# Patient Record
Sex: FEMALE | Race: WHITE | NOT HISPANIC OR LATINO | Employment: UNEMPLOYED | URBAN - METROPOLITAN AREA
[De-identification: names, ages, dates, MRNs, and addresses within clinical notes are randomized per-mention and may not be internally consistent; named-entity substitution may affect disease eponyms.]

---

## 2018-09-10 ENCOUNTER — HOSPITAL ENCOUNTER (INPATIENT)
Facility: MEDICAL CENTER | Age: 23
LOS: 1 days | End: 2018-09-11
Attending: OBSTETRICS & GYNECOLOGY | Admitting: OBSTETRICS & GYNECOLOGY
Payer: COMMERCIAL

## 2018-09-10 LAB
BASOPHILS # BLD AUTO: 0.5 % (ref 0–1.8)
BASOPHILS # BLD: 0.05 K/UL (ref 0–0.12)
CRYSTALS AMN MICRO: NORMAL
EOSINOPHIL # BLD AUTO: 0.04 K/UL (ref 0–0.51)
EOSINOPHIL NFR BLD: 0.4 % (ref 0–6.9)
ERYTHROCYTE [DISTWIDTH] IN BLOOD BY AUTOMATED COUNT: 41.1 FL (ref 35.9–50)
HCT VFR BLD AUTO: 33.9 % (ref 37–47)
HGB BLD-MCNC: 11 G/DL (ref 12–16)
HOLDING TUBE BB 8507: NORMAL
IMM GRANULOCYTES # BLD AUTO: 0.15 K/UL (ref 0–0.11)
IMM GRANULOCYTES NFR BLD AUTO: 1.5 % (ref 0–0.9)
LYMPHOCYTES # BLD AUTO: 1.38 K/UL (ref 1–4.8)
LYMPHOCYTES NFR BLD: 13.7 % (ref 22–41)
MCH RBC QN AUTO: 28 PG (ref 27–33)
MCHC RBC AUTO-ENTMCNC: 32.4 G/DL (ref 33.6–35)
MCV RBC AUTO: 86.3 FL (ref 81.4–97.8)
MONOCYTES # BLD AUTO: 0.56 K/UL (ref 0–0.85)
MONOCYTES NFR BLD AUTO: 5.6 % (ref 0–13.4)
NEUTROPHILS # BLD AUTO: 7.9 K/UL (ref 2–7.15)
NEUTROPHILS NFR BLD: 78.3 % (ref 44–72)
NRBC # BLD AUTO: 0 K/UL
NRBC BLD-RTO: 0 /100 WBC
PLATELET # BLD AUTO: 199 K/UL (ref 164–446)
PMV BLD AUTO: 10.8 FL (ref 9–12.9)
RBC # BLD AUTO: 3.93 M/UL (ref 4.2–5.4)
WBC # BLD AUTO: 10.1 K/UL (ref 4.8–10.8)

## 2018-09-10 PROCEDURE — 700102 HCHG RX REV CODE 250 W/ 637 OVERRIDE(OP): Performed by: OBSTETRICS & GYNECOLOGY

## 2018-09-10 PROCEDURE — 0UQGXZZ REPAIR VAGINA, EXTERNAL APPROACH: ICD-10-PCS | Performed by: OBSTETRICS & GYNECOLOGY

## 2018-09-10 PROCEDURE — 89060 EXAM SYNOVIAL FLUID CRYSTALS: CPT

## 2018-09-10 PROCEDURE — 770002 HCHG ROOM/CARE - OB PRIVATE (112)

## 2018-09-10 PROCEDURE — 3E033VJ INTRODUCTION OF OTHER HORMONE INTO PERIPHERAL VEIN, PERCUTANEOUS APPROACH: ICD-10-PCS | Performed by: OBSTETRICS & GYNECOLOGY

## 2018-09-10 PROCEDURE — 700111 HCHG RX REV CODE 636 W/ 250 OVERRIDE (IP)

## 2018-09-10 PROCEDURE — A9270 NON-COVERED ITEM OR SERVICE: HCPCS | Performed by: OBSTETRICS & GYNECOLOGY

## 2018-09-10 PROCEDURE — 700111 HCHG RX REV CODE 636 W/ 250 OVERRIDE (IP): Performed by: OBSTETRICS & GYNECOLOGY

## 2018-09-10 PROCEDURE — 85025 COMPLETE CBC W/AUTO DIFF WBC: CPT

## 2018-09-10 PROCEDURE — 36415 COLL VENOUS BLD VENIPUNCTURE: CPT

## 2018-09-10 PROCEDURE — 59409 OBSTETRICAL CARE: CPT

## 2018-09-10 PROCEDURE — 700105 HCHG RX REV CODE 258

## 2018-09-10 PROCEDURE — 304965 HCHG RECOVERY SERVICES

## 2018-09-10 RX ORDER — DOCUSATE SODIUM 100 MG/1
100 CAPSULE, LIQUID FILLED ORAL 2 TIMES DAILY PRN
Status: DISCONTINUED | OUTPATIENT
Start: 2018-09-10 | End: 2018-09-11 | Stop reason: HOSPADM

## 2018-09-10 RX ORDER — IBUPROFEN 600 MG/1
600 TABLET ORAL EVERY 6 HOURS PRN
Status: DISCONTINUED | OUTPATIENT
Start: 2018-09-10 | End: 2018-09-11 | Stop reason: HOSPADM

## 2018-09-10 RX ORDER — MISOPROSTOL 200 UG/1
800 TABLET ORAL
Status: DISCONTINUED | OUTPATIENT
Start: 2018-09-10 | End: 2018-09-10 | Stop reason: HOSPADM

## 2018-09-10 RX ORDER — SODIUM CHLORIDE, SODIUM LACTATE, POTASSIUM CHLORIDE, CALCIUM CHLORIDE 600; 310; 30; 20 MG/100ML; MG/100ML; MG/100ML; MG/100ML
INJECTION, SOLUTION INTRAVENOUS CONTINUOUS
Status: ACTIVE | OUTPATIENT
Start: 2018-09-10 | End: 2018-09-10

## 2018-09-10 RX ORDER — BISACODYL 10 MG
10 SUPPOSITORY, RECTAL RECTAL PRN
Status: DISCONTINUED | OUTPATIENT
Start: 2018-09-10 | End: 2018-09-11 | Stop reason: HOSPADM

## 2018-09-10 RX ORDER — HYDROCODONE BITARTRATE AND ACETAMINOPHEN 5; 325 MG/1; MG/1
1 TABLET ORAL EVERY 4 HOURS PRN
Status: DISCONTINUED | OUTPATIENT
Start: 2018-09-10 | End: 2018-09-11 | Stop reason: HOSPADM

## 2018-09-10 RX ORDER — LIDOCAINE HYDROCHLORIDE 10 MG/ML
INJECTION, SOLUTION EPIDURAL; INFILTRATION; INTRACAUDAL; PERINEURAL
Status: COMPLETED
Start: 2018-09-10 | End: 2018-09-10

## 2018-09-10 RX ORDER — VITAMIN A ACETATE, BETA CAROTENE, ASCORBIC ACID, CHOLECALCIFEROL, .ALPHA.-TOCOPHEROL ACETATE, DL-, THIAMINE MONONITRATE, RIBOFLAVIN, NIACINAMIDE, PYRIDOXINE HYDROCHLORIDE, FOLIC ACID, CYANOCOBALAMIN, CALCIUM CARBONATE, FERROUS FUMARATE, ZINC OXIDE, CUPRIC OXIDE 3080; 12; 120; 400; 1; 1.84; 3; 20; 22; 920; 25; 200; 27; 10; 2 [IU]/1; UG/1; MG/1; [IU]/1; MG/1; MG/1; MG/1; MG/1; MG/1; [IU]/1; MG/1; MG/1; MG/1; MG/1; MG/1
1 TABLET, FILM COATED ORAL EVERY MORNING
Status: DISCONTINUED | OUTPATIENT
Start: 2018-09-11 | End: 2018-09-11 | Stop reason: HOSPADM

## 2018-09-10 RX ORDER — HYDROCODONE BITARTRATE AND ACETAMINOPHEN 5; 325 MG/1; MG/1
2 TABLET ORAL EVERY 4 HOURS PRN
Status: DISCONTINUED | OUTPATIENT
Start: 2018-09-10 | End: 2018-09-11 | Stop reason: HOSPADM

## 2018-09-10 RX ORDER — SODIUM CHLORIDE, SODIUM LACTATE, POTASSIUM CHLORIDE, CALCIUM CHLORIDE 600; 310; 30; 20 MG/100ML; MG/100ML; MG/100ML; MG/100ML
INJECTION, SOLUTION INTRAVENOUS
Status: COMPLETED
Start: 2018-09-10 | End: 2018-09-10

## 2018-09-10 RX ORDER — MISOPROSTOL 200 UG/1
800 TABLET ORAL
Status: DISCONTINUED | OUTPATIENT
Start: 2018-09-10 | End: 2018-09-11 | Stop reason: HOSPADM

## 2018-09-10 RX ORDER — SODIUM CHLORIDE, SODIUM LACTATE, POTASSIUM CHLORIDE, CALCIUM CHLORIDE 600; 310; 30; 20 MG/100ML; MG/100ML; MG/100ML; MG/100ML
INJECTION, SOLUTION INTRAVENOUS PRN
Status: DISCONTINUED | OUTPATIENT
Start: 2018-09-10 | End: 2018-09-11 | Stop reason: HOSPADM

## 2018-09-10 RX ADMIN — HYDROCODONE BITARTRATE AND ACETAMINOPHEN 1 TABLET: 5; 325 TABLET ORAL at 22:29

## 2018-09-10 RX ADMIN — Medication 1 MILLI-UNITS/MIN: at 10:18

## 2018-09-10 RX ADMIN — SODIUM CHLORIDE, POTASSIUM CHLORIDE, SODIUM LACTATE AND CALCIUM CHLORIDE: 600; 310; 30; 20 INJECTION, SOLUTION INTRAVENOUS at 10:18

## 2018-09-10 RX ADMIN — Medication 125 ML/HR: at 17:48

## 2018-09-10 RX ADMIN — Medication 2000 ML/HR: at 16:37

## 2018-09-10 RX ADMIN — SODIUM CHLORIDE, SODIUM LACTATE, POTASSIUM CHLORIDE, CALCIUM CHLORIDE: 600; 310; 30; 20 INJECTION, SOLUTION INTRAVENOUS at 10:18

## 2018-09-10 RX ADMIN — IBUPROFEN 600 MG: 600 TABLET, FILM COATED ORAL at 17:48

## 2018-09-10 RX ADMIN — LIDOCAINE HYDROCHLORIDE: 10 INJECTION, SOLUTION EPIDURAL; INFILTRATION; INTRACAUDAL; PERINEURAL at 16:45

## 2018-09-10 ASSESSMENT — PATIENT HEALTH QUESTIONNAIRE - PHQ9
2. FEELING DOWN, DEPRESSED, IRRITABLE, OR HOPELESS: NOT AT ALL
SUM OF ALL RESPONSES TO PHQ9 QUESTIONS 1 AND 2: 0
1. LITTLE INTEREST OR PLEASURE IN DOING THINGS: NOT AT ALL
1. LITTLE INTEREST OR PLEASURE IN DOING THINGS: NOT AT ALL
SUM OF ALL RESPONSES TO PHQ9 QUESTIONS 1 AND 2: 0
2. FEELING DOWN, DEPRESSED, IRRITABLE, OR HOPELESS: NOT AT ALL

## 2018-09-10 ASSESSMENT — LIFESTYLE VARIABLES
ALCOHOL_USE: NO
EVER_SMOKED: NEVER

## 2018-09-10 ASSESSMENT — PAIN SCALES - GENERAL
PAINLEVEL_OUTOF10: 3
PAINLEVEL_OUTOF10: 6
PAINLEVEL_OUTOF10: 2

## 2018-09-10 NOTE — PROGRESS NOTES
"             EDC  = 39.5 weeks    0700 Received report from JULIANNA Billingsley. Phoned Dr Milner on pt status and received admission orders.     0720 Pt and FOB updated on POC to start IV Pitocin. Pt does not wish to be induced at this time and declined a SL PIV despite education. \"I would like to wait and see if I make change on my own\". Dr Milner updated on pt's wishes. MD will be in later to speak with pt.     0750 Dr Milner at bedside educating pt on risks and benefits of IOL. Pt verbalized understanding and wishes to hold off on IV Pitocin at this time.      0800 Okay per Dr Milner for pt to ambulate around unit and eat breakfast.     1000 Pt agreeable to PIV and starting IV Pitocin at this time.     1632 Viable term female born with APGARs 8/9.     1800 Pt ambulated SBA to restroom and voided without difficulty. Pericare performed.      Report given to JULIANNA Tidwell.   "

## 2018-09-10 NOTE — CARE PLAN
Problem: Pain  Goal: Alleviation of Pain or a reduction in pain to the patient's comfort goal  Outcome: PROGRESSING AS EXPECTED  Pt is coping with UC pain at this time and refusing pain interventions. Pt does not wish to have epidural.    Problem: Risk for Infection, Impaired Wound Healing  Goal: Remain free from signs and symptoms of infection  Outcome: PROGRESSING AS EXPECTED  SROM at 0445, clear. Afebrile. VSS. Limited cervical checks only as clinically indicated.

## 2018-09-10 NOTE — PROGRESS NOTES
"Department of Obstetrics and Gynecology  Labor and Delivery Progress Note    ID/CC: 23 y.o. is a  at 39w4d, PROM    S: feeling CTX are increasing, but not uncomfortable yet.    O: /78   Pulse 90   Temp 37 °C (98.6 °F) (Temporal)   Ht 1.651 m (5' 5\")   Wt 72.1 kg (159 lb)   LMP 2017   BMI 26.46 kg/m²    Gen: NAD, AAO  FHT: 130/mod misael/+accels, -decels  [place]: q4-5min  SVE: 390/-2    Oxytocin: 9miliunits/min (just increased    A/P: Yesenia Lindsay is a 23 y.o.  at 39w4d, PROM.  AVSS.  Cat I FHT.  *PROM: Oxytocin per protocol.  Making cervical change.  Anticipate vaginal delivery.   - Recheck cx in 2h or as clinically indicated.    *FWB: Reassuring, reactive, Cat I FHT.     - CEFM.  *Pain: desires to go without epidural  *Rh+/RubImm/VarImm/GBSneg   - Clears    Deacon Milner M.D., 9/10/2018, 1:23 PM   "

## 2018-09-10 NOTE — H&P
"Department of Obstetrics and Gynecology  Labor and Delivery History and Physical    Date of Admission: 9/10/2018      ID: 23 y.o.  with IUP at 39w4d     Primary OB: Deacon Milner M.D.    Attending OB: Deacon Milner M.D.    CC: LOF    HPI: Yesenia Lindsay is a 23 y.o.  at 39w4d by 8w4d U/S on 2018 ultrasound, who presents with leaking of fluid that began at 0445 this am.  She has had irregular CTX since that time, but is not uncomfortable and notes that they are not regular yet.  -VB.  +FM.  No other c/o  Current pregnancy has been uncomplicated to this point.     ROS: 10 systems reviewed and negative except as noted above.    Obstetric History    - 2014, , 37wk, M, 6lb7oz, no complications.  G2 - 2017, , 41wk, F, 7lb7oz, no complications.  G3 - Current, as noted in HPI      Past Medical History  Surgical History   negative negative      Gynecologic History  Social History   Regular menses prior to pregnancy  Denies Hx of abnormal pap smears.  Denies Hx of STIs Tobacco: denies   EtOH: denies  Street Drugs: denies      Medications  Allergies   PNV No Known Allergies     Family History   Breast cancer - PGM       O: /78   Pulse 90   Temp 36.8 °C (98.2 °F) (Temporal)   Ht 1.651 m (5' 5\")   Wt 72.1 kg (159 lb)   LMP 2017   BMI 26.46 kg/m²       Gen: NAD, AAO  Resp: unlabored  Abd: Gravid, NTTP,Cephalic by [place], No rebound or guarding  Ext: NTTP, none edema, 2+DPP  Pelvic: SVE 1/50/high per RN exam    FHT:  130/mod misael/+accels, -decels  [place]: CTX [id number]    Labs:   No results found for: WBC, RBC, HEMOGLOBIN, HEMATOCRIT, MCV, RDW, PLATELETCT    Prenatal labs:   Lab  Result   Rh positive   Antibody screen negative   Rubella Immune   HIV Non-reactive   RPR Non-reactive   HBsAg negative   Varicella Immune   Urine Culture Mixed skin tessa   Gonorrhea/chlamydia neg/neg   Carrier screening  declined   Aneuploidy screening declined   1h Glucose 126   GBS " neg       A/P: Yesenia Lindsay is a  at 39w4d by 8w4d U/S who presents with PROM.  AVSS.  Cat I FHT.  *Admit to L&D  *IV, CBC, T&S on hold  *Prelabor rupture of membranes: pt is not in labor, Dx discussed and rec for oxytocin discussed.  Pt wishes to walk and if not in labor after that would be accepting of oxytocin.    *FWB: Reassuring, reactive, Cat I FHT.  IEFM.  CEFM if oxytocin initiated.  *Pain: does not desire epidural  *Global: Rh+, RubImm, VarImm, GBS neg.    - Clears    Deacon Milner MD, MS,  9/10/2018, 8:18 AM

## 2018-09-10 NOTE — PROGRESS NOTES
0552  here with EDC of  = 39.5 weeks reporting SROM and LOF. Reports positive FM, occasional UC's, denied VB. Taken to S213 accompanied by FOB, instructed to change and call out when ready.   0604 POC discussed, questions answered. VS taken, monitors applied x2.   0608 SVE 1-/-3. Fern collected and sent to lab.   0650 Lab called with positive fern result.   0700 Report to CABRERA Swenson RN. POC discussed.

## 2018-09-11 VITALS
OXYGEN SATURATION: 98 % | SYSTOLIC BLOOD PRESSURE: 102 MMHG | TEMPERATURE: 98.7 F | RESPIRATION RATE: 20 BRPM | WEIGHT: 159 LBS | BODY MASS INDEX: 26.49 KG/M2 | HEIGHT: 65 IN | HEART RATE: 57 BPM | DIASTOLIC BLOOD PRESSURE: 62 MMHG

## 2018-09-11 LAB
ERYTHROCYTE [DISTWIDTH] IN BLOOD BY AUTOMATED COUNT: 41.4 FL (ref 35.9–50)
HCT VFR BLD AUTO: 34.2 % (ref 37–47)
HGB BLD-MCNC: 10.7 G/DL (ref 12–16)
MCH RBC QN AUTO: 27.2 PG (ref 27–33)
MCHC RBC AUTO-ENTMCNC: 31.3 G/DL (ref 33.6–35)
MCV RBC AUTO: 87 FL (ref 81.4–97.8)
PLATELET # BLD AUTO: 184 K/UL (ref 164–446)
PMV BLD AUTO: 11 FL (ref 9–12.9)
RBC # BLD AUTO: 3.93 M/UL (ref 4.2–5.4)
WBC # BLD AUTO: 14.2 K/UL (ref 4.8–10.8)

## 2018-09-11 PROCEDURE — 700102 HCHG RX REV CODE 250 W/ 637 OVERRIDE(OP): Performed by: OBSTETRICS & GYNECOLOGY

## 2018-09-11 PROCEDURE — A9270 NON-COVERED ITEM OR SERVICE: HCPCS | Performed by: OBSTETRICS & GYNECOLOGY

## 2018-09-11 PROCEDURE — 36415 COLL VENOUS BLD VENIPUNCTURE: CPT

## 2018-09-11 PROCEDURE — 85027 COMPLETE CBC AUTOMATED: CPT

## 2018-09-11 RX ORDER — PSEUDOEPHEDRINE HCL 30 MG
100 TABLET ORAL 2 TIMES DAILY PRN
Qty: 60 CAP | Refills: 1 | Status: ON HOLD | OUTPATIENT
Start: 2018-09-11 | End: 2020-03-17

## 2018-09-11 RX ORDER — VITAMIN A ACETATE, BETA CAROTENE, ASCORBIC ACID, CHOLECALCIFEROL, .ALPHA.-TOCOPHEROL ACETATE, DL-, THIAMINE MONONITRATE, RIBOFLAVIN, NIACINAMIDE, PYRIDOXINE HYDROCHLORIDE, FOLIC ACID, CYANOCOBALAMIN, CALCIUM CARBONATE, FERROUS FUMARATE, ZINC OXIDE, CUPRIC OXIDE 3080; 12; 120; 400; 1; 1.84; 3; 20; 22; 920; 25; 200; 27; 10; 2 [IU]/1; UG/1; MG/1; [IU]/1; MG/1; MG/1; MG/1; MG/1; MG/1; [IU]/1; MG/1; MG/1; MG/1; MG/1; MG/1
1 TABLET, FILM COATED ORAL EVERY MORNING
Qty: 30 TAB | COMMUNITY
Start: 2018-09-12

## 2018-09-11 RX ORDER — IBUPROFEN 600 MG/1
600 TABLET ORAL EVERY 6 HOURS PRN
Qty: 30 TAB | Refills: 1 | Status: ON HOLD | OUTPATIENT
Start: 2018-09-11 | End: 2020-03-17

## 2018-09-11 RX ADMIN — Medication 1 TABLET: at 06:40

## 2018-09-11 RX ADMIN — IBUPROFEN 600 MG: 600 TABLET, FILM COATED ORAL at 13:05

## 2018-09-11 RX ADMIN — HYDROCODONE BITARTRATE AND ACETAMINOPHEN 1 TABLET: 5; 325 TABLET ORAL at 13:05

## 2018-09-11 RX ADMIN — HYDROCODONE BITARTRATE AND ACETAMINOPHEN 1 TABLET: 5; 325 TABLET ORAL at 04:45

## 2018-09-11 RX ADMIN — IBUPROFEN 600 MG: 600 TABLET, FILM COATED ORAL at 06:40

## 2018-09-11 RX ADMIN — IBUPROFEN 600 MG: 600 TABLET, FILM COATED ORAL at 00:40

## 2018-09-11 ASSESSMENT — PAIN SCALES - GENERAL
PAINLEVEL_OUTOF10: 4
PAINLEVEL_OUTOF10: 5
PAINLEVEL_OUTOF10: 4
PAINLEVEL_OUTOF10: 0
PAINLEVEL_OUTOF10: 6

## 2018-09-11 ASSESSMENT — PATIENT HEALTH QUESTIONNAIRE - PHQ9
1. LITTLE INTEREST OR PLEASURE IN DOING THINGS: NOT AT ALL
SUM OF ALL RESPONSES TO PHQ9 QUESTIONS 1 AND 2: 0
2. FEELING DOWN, DEPRESSED, IRRITABLE, OR HOPELESS: NOT AT ALL

## 2018-09-11 NOTE — PROGRESS NOTES
DC instructions reviewed. All follow up information provided to pt along with prescriptions. Pt dc'd with hospital escort via wheelchair.

## 2018-09-11 NOTE — PROGRESS NOTES
1945: Patient arrived on unit at 1945 and to room 338. Report received from Diann Labor and Delivery RN. Assessment complete. Uterus firm, lochia moderate and continuous flow, fundal rub performed until bleeding stopped. Will continue to monitor. Pitocin turned up to 200ml.hour. VSS and WDL. Patient oriented to room and unit. FOB also at bedside. POC explained and patient verbalizes understanding. Call light within reach, and encouraged to call for additional needs.     2200:Patient resting holding infant skin to skin. No needs at this time.    0000:Patient and FOB asleep    0040:Patient medicated for pain and saline locked. Fundal rub performed. No other needs at this time.    0200: Patient and FOB asleep.    0445: Patient medicated for pain. Fundal rub performed. Lab in to draw patient.    0640:Patient breastfeeding. IV removed and given medication for pain. No other needs at this time.

## 2018-09-11 NOTE — PROGRESS NOTES
Assumed care of pt at 0700. Pt declines any pain at this time. Pt states minor cramping when breastfeeding. RN discussed POC with pt and al questions addressed. Pt hoping to dc today, will follow up with MD. RN educated pt to call prior to next breastfeeding for latch assessment, pt agreeable. Hourly rounding in place.

## 2018-09-11 NOTE — DELIVERY NOTE
DATE OF SERVICE:  09/10/2018    PRIMARY AND DELIVERING OBSTETRICIAN:  Deacon Milner MD    PROCEDURE:  Normal spontaneous vaginal delivery.    PREPROCEDURE DIAGNOSES:  1.  A 23-year-old G3, P2-0-0-2 with intrauterine pregnancy at 39 weeks 4 days   gestational age.  2.  Pre-labor rupture of membranes.  3.  Oxytocin induction of labor for the same.  4.  GBS negative.  5.  Unanesthetized labor and delivery.    POSTPROCEDURE DIAGNOSES:  1.  A 23-year-old G3, P2-0-0-2 with intrauterine pregnancy at 39 weeks 4 days   gestational age.  2.  Pre-labor rupture of membranes.  3.  Oxytocin induction of labor for the same.  4.  GBS negative.  5.  Unanesthetized labor and delivery.  6.  Postpartum, status post normal spontaneous vaginal delivery.  7.  1.5 cm transverse posterior vaginal abrasion.    ANESTHESIA:  Local for repair only.    FINDINGS:  1.  Viable female infant in direct OA position delivered at 1632 on   09/10/2018.  2.  Weight 3165 g (6 pounds 15.6 ounces).  3.  Apgars 8 at 1 minute and 9 at 5 minutes.  4.  Clear amniotic fluid.  5.  Recurrent variable decelerations at the end of the first stage of labor.  6.  Tight nuchal cord x1.  7.  Intact, normal-appearing placenta with 3-vessel cord and eccentric cord   insertion.  8.  No complications.    ESTIMATED BLOOD LOSS:  250 mL    NARRATIVE:  This 23-year-old G3, P2-0-0-2 with intrauterine pregnancy at 39   weeks 4 days gestational age, presented to labor and delivery with complaints   of leaking of fluid.  She had a positive fern test and pooling on examination.    She was diagnosed with ruptured membranes.  She was not in labor almost   3-1/2 hours after delivery.  At the time of my assessment, the patient was   diagnosed with pre-labor rupture of membranes.  This diagnosis was discussed   with her and her partner in the presence of a nurse.  The patient did not   initially desire to move forward with oxytocin induction of labor.  She walks   around for  approximately an hour and did not have any more frequent   contractions than she was having on presentation, which were approximately   every 10-12 minutes.  The patient after this did accept oxytocin induction of   labor.  The patient was started on oxytocin, which was titrated to a regular   contraction pattern.  The patient then progressed along a normal labor curve   to complete dilation.  The patient then pushed over 2 contractions until   delivery of the fetal head, which occurred atraumatically.  The patient was   noted to be in hands and knees position at this point.  Protecting the   perineum, the head was delivered atraumatically.  It was guided out gently   without traction.  At this point, a single nuchal cord was noted.  This was   attempted to be reduced at the perineum; however, it was noted to be tight and   the patient then pushed to deliver the remainder of the infant.  The nuchal   cord was reduced after delivery.  The anterior and posterior shoulders   delivered quickly and atraumatically.  The infant was delivered in whole   atraumatically.  As the patient was in the hands and knees position, we   delivered the infant to the bed.  It was wrapped in a warm blanket and warmed   and dried by the awaiting baby nurse.  The nose and mouth were suctioned with   the bulb suction.  As the infant was noted to be immediately vigorous and   moving all extremities equally, we awaited cessation of cord pulsation before   the cord was doubly clamped and cut.  The placenta then delivered quickly   thereafter and was noted to be intact as noted above.  Survey of the patient's   perineum, vulva and vagina revealed the above findings.  The posterior   vaginal abrasion was infiltrated with 1% lidocaine plain.  The edges were   reapproximated with a single figure-of-eight stitch of 3-0 Vicryl.  The repair   of the edges were hemostatic and well reapproximated.  The patient tolerated   the procedure well.  There were  no complications.  Sponge and needle counts   were correct at the end of the procedure.  The patient and her infant remained   in her birthing room before later transfer to maternity.  Dr. Milner was   present throughout and performed the entire procedure.    COMPLICATIONS:  None.    CONDITION:  Good.    DISPOSITION:  Birthing room, then maternity.       ____________________________________     MD DESTINY Tabares / NTS    DD:  09/10/2018 17:06:17  DT:  09/10/2018 17:49:40    D#:  [id number]  Job#:  [id number]

## 2018-09-11 NOTE — CONSULTS
Lactation visit done. Baby asleep in dads arms.  Mother c/o nipple tenderness. Bilaterally nipples are reddened and irritated.  Given lanolin cream. Discussed importance of deep latch to prevent nipple breakdown. Encouraged to call for latch check with next feeding. Mother reports she has a 3y.o. and 18 month old who  without difficulty for 1 year each.

## 2018-09-11 NOTE — DISCHARGE INSTRUCTIONS
POSTPARTUM DISCHARGE INSTRUCTIONS FOR MOM    YOB: 1995   Age: 23 y.o.               Admit Date: 9/10/2018     Discharge Date: 2018  Attending Doctor:  Deacon Milner M.D.                  Allergies:  Patient has no known allergies.    Discharged to home by car. Discharged via wheelchair, hospital escort: Yes.  Special equipment needed: Not Applicable  Belongings with: Personal  Be sure to schedule a follow-up appointment with your primary care doctor or any specialists as instructed.     Discharge Plan:   Diet Plan: Discussed  Activity Level: Discussed  Confirmed Follow up Appointment: Patient to Call and Schedule Appointment  Confirmed Symptoms Management: Discussed  Medication Reconciliation Updated: Yes  Influenza Vaccine Indication: Patient Refuses    REASONS TO CALL YOUR OBSTETRICIAN:  1.   Persistent fever or shaking chills (Temperature higher than 100.4)  2.   Heavy bleeding (soaking more than 1 pad per hour); Passing clots  3.   Foul odor from vagina  4.   Mastitis (Breast infection; breast pain, chills, fever, redness)  5.   Urinary pain, burning or frequency  6.   Episiotomy infection  7.   Abdominal incision infection  8.   Severe depression longer than 24 hours    HAND WASHING  · Prior to handling the baby.  · Before breastfeeding or bottle feeding baby.  · After using the bathroom or changing the baby's diaper.    WOUND CARE  Ask your physician for additional care instructions.  In general:    ·  Incision:      · Keep clean and dry.    · Do NOT lift anything heavier than your baby for up to 6 weeks.    · There should not be any opening or pus.      VAGINAL CARE  · Nothing inside vagina for 6 weeks: no sexual intercourse, tampons or douching.  · Bleeding may continue for 2-4 weeks.  Amount may vary.    · Call your physician for heavy bleeding which means soaking more than 1 pad per hour    BIRTH CONTROL  · It is possible to become pregnant at any time after delivery and while  "breastfeeding.  · Plan to discuss a method of birth control with your physician at your follow up visit. visit.    DIET AND ELIMINATION  · Eating more fiber (bran cereal, fruits, and vegetables) and drinking plenty of fluids will help to avoid constipation.  · Urinary frequency after childbirth is normal.    POSTPARTUM BLUES  During the first few days after birth, you may experience a sense of the \"blues\" which may include impatience, irritability or even crying.  These feeling come and go quickly.  However, as many as 1 in 10 women experience emotional symptoms known as postpartum depression.    Postpartum depression:  May start as early as the second or third day after delivery or take several weeks or months to develop.  Symptoms of \"blues\" are present, but are more intense:  Crying spells; loss of appetite; feelings of hopelessness or loss of control; fear of touching the baby; over concern or no concern at all about the baby; little or no concern about your own appearance/caring for yourself; and/or inability to sleep or excessive sleeping.  Contact your physician if you are experiencing any of these symptoms.    Crisis Hotline:  · [place] Crisis Hotline:  8-525-GMMUPBE  Or 1-590.151.3882  · [place] Crisis Hotline:  1-645.119.6458  Or 673-186-2781    PREVENTING SHAKEN BABY:  If you are angry or stressed, PUT THE BABY IN THE CRIB, step away, take some deep breaths, and wait until you are calm to care for the baby.  DO NOT SHAKE THE BABY.  You are not alone, call a supporter for help.    · Crisis Call Center 24/7 crisis line 171-046-6566 or 1-527.198.8553  · You can also text them, text \"ANSWER\" to 790524    QUIT SMOKING/TOBACCO USE:  I understand the use of any tobacco products increases my chance of suffering from future heart disease and could cause other illnesses which may shorten my life. Quitting the use of tobacco products is the single most important thing I can do to improve my health. For further " information on smoking / tobacco cessation call a Toll Free Quit Line at 1-752.670.4168 (*[employer] [place]) or 1-981.459.5011 (American Lung Association) or you can access the web based program at www.lungusa.org.    · [place] Tobacco Users Help Line:  (359) 165-3515       Toll Free: 1-907.675.1215  · Quit Tobacco Program [facility] (812)797-6086    DEPRESSION / SUICIDE RISK:  As you are discharged from this [facility], it is important to learn how to keep safe from harming yourself.    Recognize the warning signs:  · Abrupt changes in personality, positive or negative- including increase in energy   · Giving away possessions  · Change in eating patterns- significant weight changes-  positive or negative  · Change in sleeping patterns- unable to sleep or sleeping all the time   · Unwillingness or inability to communicate  · Depression  · Unusual sadness, discouragement and loneliness  · Talk of wanting to die  · Neglect of personal appearance   · Rebelliousness- reckless behavior  · Withdrawal from people/activities they love  · Confusion- inability to concentrate     If you or a loved one observes any of these behaviors or has concerns about self-harm, here's what you can do:  · Talk about it- your feelings and reasons for harming yourself  · Remove any means that you might use to hurt yourself (examples: pills, rope, extension cords, firearm)  · Get professional help from the community (Mental Health, Substance Abuse, psychological counseling)  · Do not be alone:Call your Safe Contact- someone whom you trust who will be there for you.  · Call your local CRISIS HOTLINE 842-0603 or 021-239-9598  · Call your local Children's Mobile Crisis Response Team [place] (442) 123-7419 or www.[employer]  · Call the toll free National Suicide Prevention Hotlines   · National Suicide Prevention Lifeline 519-177-QYGN (8655)  · National Hope Line Network 800-SUICIDE  (831-3687)    DISCHARGE SURVEY:  Thank you for choosing [facility].  We hope we provided you with very good care.  You may be receiving a survey in the mail.  Please fill it out.  Your opinion is valuable to us.    ADDITIONAL EDUCATIONAL MATERIALS GIVEN TO PATIENT:        My signature on this form indicates that:  1.  I have reviewed and understand the above information  2.  My questions regarding this information have been answered to my satisfaction.  3.  I have formulated a plan with my discharge nurse to obtain my prescribed medication for home.

## 2018-09-11 NOTE — DISCHARGE SUMMARY
Discharge Summary:      Yesenia Lindsay      Admit Date:   9/10/2018  Discharge Date:  2018     Admitting diagnosis:  Pregnancy   Labor and delivery indication for care or intervention  Discharge Diagnosis: Status post vaginal, spontaneous.  Pregnancy Complications: none  Tubal Ligation:  yes        History:  History reviewed. No pertinent past medical history.  OB History    Para Term  AB Living   3 2 2     2   SAB TAB Ectopic Molar Multiple Live Births             2      # Outcome Date GA Lbr Aurelio/2nd Weight Sex Delivery Anes PTL Lv   3 Current            2 Term            1 Term                    Patient has no known allergies.  There are no active problems to display for this patient.       Hospital Course:   23 y.o. , now para 3, was admitted with the above mentioned diagnosis, underwent Induction of Labor for PROM and had vaginal, spontaneous. Patient postpartum course was unremarkable, with progressive advancement in diet , ambulation and toleration of oral analgesia. Patient without complaints today and desires discharge.      Vitals:    18 0043 18 0400 18 0738 18 0800   BP: (!) 96/59 105/62 102/62 102/62   Pulse: 71 88 (!) 57 (!) 57   Resp: 18 19 20 20   Temp: 36.4 °C (97.6 °F) 36.4 °C (97.6 °F) 37.1 °C (98.7 °F) 37.1 °C (98.7 °F)   TempSrc:       SpO2: 95% 95% 98% 98%   Weight:       Height:           Current Facility-Administered Medications   Medication Dose   • ibuprofen (MOTRIN) tablet 600 mg  600 mg   • HYDROcodone-acetaminophen (NORCO) 5-325 MG per tablet 1 Tab  1 Tab   • HYDROcodone-acetaminophen (NORCO) 5-325 MG per tablet 2 Tab  2 Tab   • LR infusion     • PRN oxytocin (PITOCIN) (20 Units/1000 mL) PRN for excessive uterine bleeding - See Admin Instr  125-999 mL/hr   • miSOPROStol (CYTOTEC) tablet 800 mcg  800 mcg   • docusate sodium (COLACE) capsule 100 mg  100 mg   • bisacodyl (DULCOLAX) suppository 10 mg  10 mg   • prenatal plus vitamin  (STUARTNATAL 1+1) 27-1 MG tablet 1 Tab  1 Tab   • oxytocin (PITOCIN) infusion (for postpartum)   mL/hr       Exam:  Gen: NAD, AAO  Abdomen: Abdomen soft, non-tender. No masses,  No organomegally, FF @ U-1. No rebound/guarding.  Fundus Tender: No  Incision: none  Extremity: no edema, no redness or tenderness in the calves or thighs, 2+DPP, Homans sign is negative, no sign of DVT       Labs:  Recent Labs      09/10/18   1015  09/11/18   0447   WBC  10.1  14.2*   RBC  3.93*  3.93*   HEMOGLOBIN  11.0*  10.7*   HEMATOCRIT  33.9*  34.2*   MCV  86.3  87.0   MCH  28.0  27.2   MCHC  32.4*  31.3*   RDW  41.1  41.4   PLATELETCT  199  184   MPV  10.8  11.0        Activity:   Discharge to home  Pelvic Rest x 6 weeks    Assessment:  normal postpartum course  Discharge Assessment: No heavy bleeding or foul vaginal discharge      Follow up: 6wk with Deacon Milner M.D.      Discharge Meds:   Current Outpatient Prescriptions   Medication Sig Dispense Refill   • ibuprofen (MOTRIN) 600 MG Tab Take 1 Tab by mouth every 6 hours as needed (For cramping after delivery; do not give if patient is receiving ketorolac (Toradol)). 30 Tab 1   • docusate sodium 100 MG Cap Take 100 mg by mouth 2 times a day as needed for Constipation. 60 Cap 1   • [START ON 9/12/2018] prenatal plus vitamin (STUARTNATAL 1+1) 27-1 MG Tab tablet Take 1 Tab by mouth every morning. 30 Tab        Deacon Milner M.D.

## 2019-07-02 ENCOUNTER — HOSPITAL ENCOUNTER (OUTPATIENT)
Dept: HOSPITAL 8 - CFH | Age: 24
Discharge: HOME | End: 2019-07-02
Attending: OBSTETRICS & GYNECOLOGY
Payer: COMMERCIAL

## 2019-07-02 DIAGNOSIS — N63.10: Primary | ICD-10-CM

## 2019-07-02 PROCEDURE — 76642 ULTRASOUND BREAST LIMITED: CPT

## 2019-09-11 LAB
ABO GROUP BLD: NORMAL
BLD GP AB SCN SERPL QL: NORMAL
HBV SURFACE AG SERPL QL IA: NORMAL
HIV 1+2 AB+HIV1 P24 AG SERPL QL IA: NORMAL
RH BLD: NORMAL
RUBV IGG SERPL IA-ACNC: NORMAL
TREPONEMA PALLIDUM IGG+IGM AB [PRESENCE] IN SERUM OR PLASMA BY IMMUNOASSAY: NORMAL

## 2020-03-17 ENCOUNTER — HOSPITAL ENCOUNTER (INPATIENT)
Facility: MEDICAL CENTER | Age: 25
LOS: 1 days | End: 2020-03-18
Attending: OBSTETRICS & GYNECOLOGY | Admitting: OBSTETRICS & GYNECOLOGY
Payer: COMMERCIAL

## 2020-03-17 LAB
BASOPHILS # BLD AUTO: 0.2 % (ref 0–1.8)
BASOPHILS # BLD: 0.03 K/UL (ref 0–0.12)
EOSINOPHIL # BLD AUTO: 0.06 K/UL (ref 0–0.51)
EOSINOPHIL NFR BLD: 0.5 % (ref 0–6.9)
ERYTHROCYTE [DISTWIDTH] IN BLOOD BY AUTOMATED COUNT: 44 FL (ref 35.9–50)
HCT VFR BLD AUTO: 39.5 % (ref 37–47)
HGB BLD-MCNC: 12.7 G/DL (ref 12–16)
HOLDING TUBE BB 8507: NORMAL
IMM GRANULOCYTES # BLD AUTO: 0.08 K/UL (ref 0–0.11)
IMM GRANULOCYTES NFR BLD AUTO: 0.7 % (ref 0–0.9)
LYMPHOCYTES # BLD AUTO: 1.53 K/UL (ref 1–4.8)
LYMPHOCYTES NFR BLD: 12.6 % (ref 22–41)
MCH RBC QN AUTO: 28.7 PG (ref 27–33)
MCHC RBC AUTO-ENTMCNC: 32.2 G/DL (ref 33.6–35)
MCV RBC AUTO: 89.4 FL (ref 81.4–97.8)
MONOCYTES # BLD AUTO: 0.82 K/UL (ref 0–0.85)
MONOCYTES NFR BLD AUTO: 6.7 % (ref 0–13.4)
NEUTROPHILS # BLD AUTO: 9.65 K/UL (ref 2–7.15)
NEUTROPHILS NFR BLD: 79.3 % (ref 44–72)
NRBC # BLD AUTO: 0 K/UL
NRBC BLD-RTO: 0 /100 WBC
PLATELET # BLD AUTO: 227 K/UL (ref 164–446)
PMV BLD AUTO: 10.6 FL (ref 9–12.9)
RBC # BLD AUTO: 4.42 M/UL (ref 4.2–5.4)
WBC # BLD AUTO: 12.2 K/UL (ref 4.8–10.8)

## 2020-03-17 PROCEDURE — 770002 HCHG ROOM/CARE - OB PRIVATE (112)

## 2020-03-17 PROCEDURE — 304965 HCHG RECOVERY SERVICES

## 2020-03-17 PROCEDURE — 700111 HCHG RX REV CODE 636 W/ 250 OVERRIDE (IP): Performed by: OBSTETRICS & GYNECOLOGY

## 2020-03-17 PROCEDURE — 10907ZC DRAINAGE OF AMNIOTIC FLUID, THERAPEUTIC FROM PRODUCTS OF CONCEPTION, VIA NATURAL OR ARTIFICIAL OPENING: ICD-10-PCS | Performed by: OBSTETRICS & GYNECOLOGY

## 2020-03-17 PROCEDURE — 59409 OBSTETRICAL CARE: CPT

## 2020-03-17 PROCEDURE — 700102 HCHG RX REV CODE 250 W/ 637 OVERRIDE(OP): Performed by: OBSTETRICS & GYNECOLOGY

## 2020-03-17 PROCEDURE — 85025 COMPLETE CBC W/AUTO DIFF WBC: CPT

## 2020-03-17 PROCEDURE — A9270 NON-COVERED ITEM OR SERVICE: HCPCS | Performed by: OBSTETRICS & GYNECOLOGY

## 2020-03-17 PROCEDURE — 36415 COLL VENOUS BLD VENIPUNCTURE: CPT

## 2020-03-17 PROCEDURE — 700105 HCHG RX REV CODE 258: Performed by: OBSTETRICS & GYNECOLOGY

## 2020-03-17 RX ORDER — DOCUSATE SODIUM 100 MG/1
100 CAPSULE, LIQUID FILLED ORAL 2 TIMES DAILY PRN
Status: DISCONTINUED | OUTPATIENT
Start: 2020-03-17 | End: 2020-03-18 | Stop reason: HOSPADM

## 2020-03-17 RX ORDER — IBUPROFEN 600 MG/1
600 TABLET ORAL EVERY 6 HOURS PRN
Status: DISCONTINUED | OUTPATIENT
Start: 2020-03-17 | End: 2020-03-18 | Stop reason: HOSPADM

## 2020-03-17 RX ORDER — SODIUM CHLORIDE, SODIUM LACTATE, POTASSIUM CHLORIDE, CALCIUM CHLORIDE 600; 310; 30; 20 MG/100ML; MG/100ML; MG/100ML; MG/100ML
INJECTION, SOLUTION INTRAVENOUS PRN
Status: DISCONTINUED | OUTPATIENT
Start: 2020-03-17 | End: 2020-03-18 | Stop reason: HOSPADM

## 2020-03-17 RX ORDER — ONDANSETRON 4 MG/1
4 TABLET, ORALLY DISINTEGRATING ORAL EVERY 6 HOURS PRN
Status: DISCONTINUED | OUTPATIENT
Start: 2020-03-17 | End: 2020-03-18 | Stop reason: HOSPADM

## 2020-03-17 RX ORDER — IBUPROFEN 600 MG/1
600 TABLET ORAL EVERY 6 HOURS PRN
Status: DISCONTINUED | OUTPATIENT
Start: 2020-03-17 | End: 2020-03-17

## 2020-03-17 RX ORDER — OXYCODONE HYDROCHLORIDE AND ACETAMINOPHEN 5; 325 MG/1; MG/1
1 TABLET ORAL EVERY 4 HOURS PRN
Status: DISCONTINUED | OUTPATIENT
Start: 2020-03-17 | End: 2020-03-18 | Stop reason: HOSPADM

## 2020-03-17 RX ORDER — DEXTROSE, SODIUM CHLORIDE, SODIUM LACTATE, POTASSIUM CHLORIDE, AND CALCIUM CHLORIDE 5; .6; .31; .03; .02 G/100ML; G/100ML; G/100ML; G/100ML; G/100ML
INJECTION, SOLUTION INTRAVENOUS CONTINUOUS
Status: DISCONTINUED | OUTPATIENT
Start: 2020-03-17 | End: 2020-03-18 | Stop reason: HOSPADM

## 2020-03-17 RX ORDER — SODIUM CHLORIDE, SODIUM LACTATE, POTASSIUM CHLORIDE, CALCIUM CHLORIDE 600; 310; 30; 20 MG/100ML; MG/100ML; MG/100ML; MG/100ML
INJECTION, SOLUTION INTRAVENOUS CONTINUOUS
Status: DISPENSED | OUTPATIENT
Start: 2020-03-17 | End: 2020-03-17

## 2020-03-17 RX ORDER — VITAMIN A ACETATE, BETA CAROTENE, ASCORBIC ACID, CHOLECALCIFEROL, .ALPHA.-TOCOPHEROL ACETATE, DL-, THIAMINE MONONITRATE, RIBOFLAVIN, NIACINAMIDE, PYRIDOXINE HYDROCHLORIDE, FOLIC ACID, CYANOCOBALAMIN, CALCIUM CARBONATE, FERROUS FUMARATE, ZINC OXIDE, CUPRIC OXIDE 3080; 12; 120; 400; 1; 1.84; 3; 20; 22; 920; 25; 200; 27; 10; 2 [IU]/1; UG/1; MG/1; [IU]/1; MG/1; MG/1; MG/1; MG/1; MG/1; [IU]/1; MG/1; MG/1; MG/1; MG/1; MG/1
1 TABLET, FILM COATED ORAL EVERY MORNING
Status: DISCONTINUED | OUTPATIENT
Start: 2020-03-18 | End: 2020-03-18 | Stop reason: HOSPADM

## 2020-03-17 RX ORDER — ONDANSETRON 2 MG/ML
4 INJECTION INTRAMUSCULAR; INTRAVENOUS EVERY 6 HOURS PRN
Status: DISCONTINUED | OUTPATIENT
Start: 2020-03-17 | End: 2020-03-18 | Stop reason: HOSPADM

## 2020-03-17 RX ORDER — MISOPROSTOL 200 UG/1
800 TABLET ORAL
Status: COMPLETED | OUTPATIENT
Start: 2020-03-17 | End: 2020-03-17

## 2020-03-17 RX ORDER — ALUMINA, MAGNESIA, AND SIMETHICONE 2400; 2400; 240 MG/30ML; MG/30ML; MG/30ML
30 SUSPENSION ORAL EVERY 6 HOURS PRN
Status: DISCONTINUED | OUTPATIENT
Start: 2020-03-17 | End: 2020-03-17 | Stop reason: HOSPADM

## 2020-03-17 RX ORDER — OXYCODONE AND ACETAMINOPHEN 10; 325 MG/1; MG/1
1 TABLET ORAL EVERY 4 HOURS PRN
Status: DISCONTINUED | OUTPATIENT
Start: 2020-03-17 | End: 2020-03-18 | Stop reason: HOSPADM

## 2020-03-17 RX ORDER — MISOPROSTOL 200 UG/1
600 TABLET ORAL
Status: DISCONTINUED | OUTPATIENT
Start: 2020-03-17 | End: 2020-03-18 | Stop reason: HOSPADM

## 2020-03-17 RX ADMIN — OXYTOCIN 125 ML/HR: 10 INJECTION, SOLUTION INTRAMUSCULAR; INTRAVENOUS at 20:15

## 2020-03-17 RX ADMIN — OXYTOCIN 2000 ML/HR: 10 INJECTION, SOLUTION INTRAMUSCULAR; INTRAVENOUS at 18:42

## 2020-03-17 RX ADMIN — SODIUM CHLORIDE 5 MILLION UNITS: 900 INJECTION INTRAVENOUS at 13:55

## 2020-03-17 RX ADMIN — IBUPROFEN 600 MG: 600 TABLET ORAL at 19:37

## 2020-03-17 RX ADMIN — SODIUM CHLORIDE 2.5 MILLION UNITS: 9 INJECTION, SOLUTION INTRAVENOUS at 17:31

## 2020-03-17 RX ADMIN — SODIUM CHLORIDE, POTASSIUM CHLORIDE, SODIUM LACTATE AND CALCIUM CHLORIDE: 600; 310; 30; 20 INJECTION, SOLUTION INTRAVENOUS at 17:39

## 2020-03-17 RX ADMIN — SODIUM CHLORIDE, POTASSIUM CHLORIDE, SODIUM LACTATE AND CALCIUM CHLORIDE: 600; 310; 30; 20 INJECTION, SOLUTION INTRAVENOUS at 19:17

## 2020-03-17 SDOH — ECONOMIC STABILITY: FOOD INSECURITY: WITHIN THE PAST 12 MONTHS, THE FOOD YOU BOUGHT JUST DIDN'T LAST AND YOU DIDN'T HAVE MONEY TO GET MORE.: NEVER TRUE

## 2020-03-17 SDOH — ECONOMIC STABILITY: FOOD INSECURITY: WITHIN THE PAST 12 MONTHS, YOU WORRIED THAT YOUR FOOD WOULD RUN OUT BEFORE YOU GOT MONEY TO BUY MORE.: NEVER TRUE

## 2020-03-17 SDOH — ECONOMIC STABILITY: TRANSPORTATION INSECURITY
IN THE PAST 12 MONTHS, HAS THE LACK OF TRANSPORTATION KEPT YOU FROM MEDICAL APPOINTMENTS OR FROM GETTING MEDICATIONS?: NO

## 2020-03-17 SDOH — ECONOMIC STABILITY: TRANSPORTATION INSECURITY
IN THE PAST 12 MONTHS, HAS LACK OF TRANSPORTATION KEPT YOU FROM MEETINGS, WORK, OR FROM GETTING THINGS NEEDED FOR DAILY LIVING?: NO

## 2020-03-17 ASSESSMENT — LIFESTYLE VARIABLES
EVER_SMOKED: NEVER
ALCOHOL_USE: NO

## 2020-03-17 ASSESSMENT — PATIENT HEALTH QUESTIONNAIRE - PHQ9
2. FEELING DOWN, DEPRESSED, IRRITABLE, OR HOPELESS: NOT AT ALL
SUM OF ALL RESPONSES TO PHQ9 QUESTIONS 1 AND 2: 0
1. LITTLE INTEREST OR PLEASURE IN DOING THINGS: NOT AT ALL

## 2020-03-17 NOTE — PROGRESS NOTES
"1404: Assumed care of pt, got report from triage RN, transferred to a labor room, discussed POC and addressed all questions. Admission procedures completed. POC is to get 2nd dose of abx prior to AROM.   1600: Pt moving frequently, coping well with contractions, declining pain medications. Provided with birthing ball, and is frequently changing positions.  1838: Dr Snyder at bedside for SVE, 9 cm, and AROM, clear fluid, pt rapidly reported that \"the baby is coming\", pt was assisted to hands and knees position and  of viable male at 1839, 8/8 apgars, skin to skin.   1900: Fundus firm, lochia light. Report given to Morenita LEVINE.   "

## 2020-03-17 NOTE — H&P
DATE OF ADMISSION:  2020     IDENTIFICATION:  This is a 25-year-old  4, para 3-0-0-3, with an EDC of   2019, EGA of 39 and 2/7 weeks who presents with a chief complaint of   uterine contractions.      HISTORY OF PRESENT ILLNESS:  This is a patient of Dr. Witt who has gotten   good prenatal care, who presents today complaining of uterine contractions.    Denies spontaneous rupture of membranes, vaginal bleeding.  Admits to good   fetal movement.  Denies symptoms of PIH.  Here in labor and delivery, her   cervix was 4-5, 90 with a bulging bag of water.  She did have a GBS UTI in   September with treatment and she repeated positive in third trimester as well.    She presents today as such.  Her prenatal care has been otherwise   uncomplicated.  She denies nausea, vomiting, fever, chills, change in bowel or   bladder habits.  Admits to good fetal movement.  Denies symptoms of PIH,   headaches, visual changes or epigastric pain.  She is quite excited to move   forward with her labor.      OBSTETRIC HISTORY:  Significant for 3 previous vaginal deliveries.      GYNECOLOGIC HISTORY:  No STDs or abnormal Paps.      PAST MEDICAL HISTORY:  ALLERGIES:  None.    CURRENT MEDICATIONS:  Prenatal vitamins.    MEDICAL PROBLEMS:  None.    SURGICAL HISTORY:  None.      SOCIAL HISTORY:  Denies alcohol, tobacco or drug use.      FAMILY HISTORY:  Noncontributory.      REVIEW OF SYSTEMS:  Review of systems x12 is negative per AMA standards   available in chart.      LABORATORY DATA:  She is beta strep positive.  She had normal Glucola.  She is   Rh positive, rubella immune, declined genetics.    PHYSICAL EXAMINATION:    VITAL SIGNS:  Patient is afebrile.  Vital signs within normal limits.  Fetal   heart tracings reactive, category 1.  She is celia irregularly.    GENERAL:  She is awake, alert, in no apparent distress.    NECK:  Supple.    HEART:  Regular.    CHEST:  Clear.    BREASTS:  Symmetrical.    ABDOMEN:   Soft, gravid, size appropriate for dates.    EXTREMITIES:  Negative.    GYNECOLOGIC:  As above.      ASSESSMENT:    1.  At this time is pregnancy at 39 and 2/7 weeks.    2.  Labor.    3.  Beta streptococcus positive.      PLAN:  At this time:    1.  Admit.    2.  Fetal heart tone and contraction monitoring.    3.  Pain control if desired.    4.  Pitocin if indicated.    5.  IV penicillin.    6.  Consent for and anticipate normal spontaneous vaginal delivery.       ____________________________________     MD IQRA Vyas / BRITTANY    DD:  03/17/2020 13:38:21  DT:  03/17/2020 16:31:27    D#:  [id number]  Job#:  [id number]

## 2020-03-17 NOTE — CARE PLAN
Problem: Pain  Goal: Alleviation of Pain or a reduction in pain to the patient's comfort goal  Outcome: PROGRESSING AS EXPECTED  Note: Pt is coping well with pain of contractions and requests non-pharmalogical measures as needed.     Problem: Risk for Infection, Impaired Wound Healing  Goal: Remain free from signs and symptoms of infection  Outcome: PROGRESSING AS EXPECTED  Note: Pt showing no s/s of infection.

## 2020-03-18 VITALS
RESPIRATION RATE: 18 BRPM | SYSTOLIC BLOOD PRESSURE: 107 MMHG | BODY MASS INDEX: 26.49 KG/M2 | TEMPERATURE: 98 F | DIASTOLIC BLOOD PRESSURE: 67 MMHG | HEIGHT: 65 IN | HEART RATE: 90 BPM | OXYGEN SATURATION: 95 % | WEIGHT: 159 LBS

## 2020-03-18 LAB
ERYTHROCYTE [DISTWIDTH] IN BLOOD BY AUTOMATED COUNT: 43 FL (ref 35.9–50)
HCT VFR BLD AUTO: 33.3 % (ref 37–47)
HGB BLD-MCNC: 11 G/DL (ref 12–16)
MCH RBC QN AUTO: 29.1 PG (ref 27–33)
MCHC RBC AUTO-ENTMCNC: 33 G/DL (ref 33.6–35)
MCV RBC AUTO: 88.1 FL (ref 81.4–97.8)
PLATELET # BLD AUTO: 190 K/UL (ref 164–446)
PMV BLD AUTO: 10.4 FL (ref 9–12.9)
RBC # BLD AUTO: 3.78 M/UL (ref 4.2–5.4)
WBC # BLD AUTO: 13.4 K/UL (ref 4.8–10.8)

## 2020-03-18 PROCEDURE — A9270 NON-COVERED ITEM OR SERVICE: HCPCS | Performed by: OBSTETRICS & GYNECOLOGY

## 2020-03-18 PROCEDURE — 85027 COMPLETE CBC AUTOMATED: CPT

## 2020-03-18 PROCEDURE — 36415 COLL VENOUS BLD VENIPUNCTURE: CPT

## 2020-03-18 PROCEDURE — 700102 HCHG RX REV CODE 250 W/ 637 OVERRIDE(OP): Performed by: OBSTETRICS & GYNECOLOGY

## 2020-03-18 RX ORDER — IBUPROFEN 600 MG/1
600 TABLET ORAL EVERY 6 HOURS PRN
Qty: 30 TAB | Refills: 1 | Status: ON HOLD | OUTPATIENT
Start: 2020-03-18 | End: 2021-09-22

## 2020-03-18 RX ADMIN — IBUPROFEN 600 MG: 600 TABLET ORAL at 08:25

## 2020-03-18 RX ADMIN — VITAMIN A, VITAMIN C, VITAMIN D-3, VITAMIN E, VITAMIN B-1, VITAMIN B-2, NIACIN, VITAMIN B-6, CALCIUM, IRON, ZINC, COPPER 1 TABLET: 4000; 120; 400; 22; 1.84; 3; 20; 10; 1; 12; 200; 27; 25; 2 TABLET ORAL at 08:25

## 2020-03-18 RX ADMIN — IBUPROFEN 600 MG: 600 TABLET ORAL at 02:16

## 2020-03-18 RX ADMIN — IBUPROFEN 600 MG: 600 TABLET ORAL at 14:22

## 2020-03-18 ASSESSMENT — EDINBURGH POSTNATAL DEPRESSION SCALE (EPDS)
I HAVE BEEN ABLE TO LAUGH AND SEE THE FUNNY SIDE OF THINGS: AS MUCH AS I ALWAYS COULD
I HAVE BEEN SO UNHAPPY THAT I HAVE HAD DIFFICULTY SLEEPING: NOT AT ALL
THINGS HAVE BEEN GETTING ON TOP OF ME: NO, I HAVE BEEN COPING AS WELL AS EVER
I HAVE BEEN SO UNHAPPY THAT I HAVE BEEN CRYING: NO, NEVER
I HAVE FELT SCARED OR PANICKY FOR NO GOOD REASON: NO, NOT AT ALL
I HAVE BLAMED MYSELF UNNECESSARILY WHEN THINGS WENT WRONG: NO, NEVER
I HAVE LOOKED FORWARD WITH ENJOYMENT TO THINGS: AS MUCH AS I EVER DID
THE THOUGHT OF HARMING MYSELF HAS OCCURRED TO ME: NEVER
I HAVE BEEN ANXIOUS OR WORRIED FOR NO GOOD REASON: NO, NOT AT ALL
I HAVE FELT SAD OR MISERABLE: NO, NOT AT ALL

## 2020-03-18 NOTE — DISCHARGE INSTRUCTIONS
POSTPARTUM DISCHARGE INSTRUCTIONS FOR MOM    YOB: 1995   Age: 25 y.o.               Admit Date: 3/17/2020     Discharge Date: 3/18/2020  Attending Doctor:  Deacon Milner M.D.                  Allergies:  Patient has no known allergies.    Discharged to home by car. Discharged via wheelchair, hospital escort: Yes.  Special equipment needed: Not Applicable  Belongings with: Personal  Be sure to schedule a follow-up appointment with your primary care doctor or any specialists as instructed.     Discharge Plan: Follow up with your doctor in 6 weeks or earlier if you have any issues.  Diet Plan: Discussed  Activity Level: Discussed  Confirmed Follow up Appointment: Patient to Call and Schedule Appointment  Confirmed Symptoms Management: Discussed  Medication Reconciliation Updated: Yes  Influenza Vaccine Indication: Not indicated: Previously immunized this influenza season and > 8 years of age    REASONS TO CALL YOUR OBSTETRICIAN:  1.   Persistent fever or shaking chills (Temperature higher than 100.4)  2.   Heavy bleeding (soaking more than 1 pad per hour); Passing clots  3.   Foul odor from vagina  4.   Mastitis (Breast infection; breast pain, chills, fever, redness)  5.   Urinary pain, burning or frequency  6.   Episiotomy infection  7.   Abdominal incision infection  8.   Severe depression longer than 24 hours    HAND WASHING  · Prior to handling the baby.  · Before breastfeeding or bottle feeding baby.  · After using the bathroom or changing the baby's diaper.    WOUND CARE  Ask your physician for additional care instructions.  In general:    ·  Incision:      · Keep clean and dry.    · Do NOT lift anything heavier than your baby for up to 6 weeks.    · There should not be any opening or pus.      VAGINAL CARE  · Nothing inside vagina for 6 weeks: no sexual intercourse, tampons or douching.  · Bleeding may continue for 2-4 weeks.  Amount may vary.    · Call your physician for heavy bleeding  "which means soaking more than 1 pad per hour    BIRTH CONTROL  · It is possible to become pregnant at any time after delivery and while breastfeeding.  · Plan to discuss a method of birth control with your physician at your follow up visit. visit.    DIET AND ELIMINATION  · Eating more fiber (bran cereal, fruits, and vegetables) and drinking plenty of fluids will help to avoid constipation.  · Urinary frequency after childbirth is normal.    POSTPARTUM BLUES  During the first few days after birth, you may experience a sense of the \"blues\" which may include impatience, irritability or even crying.  These feeling come and go quickly.  However, as many as 1 in 10 women experience emotional symptoms known as postpartum depression.    Postpartum depression:  May start as early as the second or third day after delivery or take several weeks or months to develop.  Symptoms of \"blues\" are present, but are more intense:  Crying spells; loss of appetite; feelings of hopelessness or loss of control; fear of touching the baby; over concern or no concern at all about the baby; little or no concern about your own appearance/caring for yourself; and/or inability to sleep or excessive sleeping.  Contact your physician if you are experiencing any of these symptoms.    Crisis Hotline:  · [place] Crisis Hotline:  7-571-CVMINLU  Or 1-123.836.5875  · [place] Crisis Hotline:  1-695.132.3121  Or 349-769-9087    PREVENTING SHAKEN BABY:  If you are angry or stressed, PUT THE BABY IN THE CRIB, step away, take some deep breaths, and wait until you are calm to care for the baby.  DO NOT SHAKE THE BABY.  You are not alone, call a supporter for help.    · Crisis Call Center 24/7 crisis line 685-217-6148 or 1-997.173.9650  · You can also text them, text \"ANSWER\" to 246468    QUIT SMOKING/TOBACCO USE:  I understand the use of any tobacco products increases my chance of suffering from future heart disease and could cause other illnesses which may " shorten my life. Quitting the use of tobacco products is the single most important thing I can do to improve my health. For further information on smoking / tobacco cessation call a Toll Free Quit Line at 1-678.875.2662 (*National Cancer [place]) or 1-271.895.7696 (American Lung Association) or you can access the web based program at www.lungusa.org.    · [place] Tobacco Users Help Line:  (658) 862-8113       Toll Free: 1-311.777.3843  · Quit Tobacco Program [facility] (578)253-4782    DEPRESSION / SUICIDE RISK:  As you are discharged from this [facility], it is important to learn how to keep safe from harming yourself.    Recognize the warning signs:  · Abrupt changes in personality, positive or negative- including increase in energy   · Giving away possessions  · Change in eating patterns- significant weight changes-  positive or negative  · Change in sleeping patterns- unable to sleep or sleeping all the time   · Unwillingness or inability to communicate  · Depression  · Unusual sadness, discouragement and loneliness  · Talk of wanting to die  · Neglect of personal appearance   · Rebelliousness- reckless behavior  · Withdrawal from people/activities they love  · Confusion- inability to concentrate     If you or a loved one observes any of these behaviors or has concerns about self-harm, here's what you can do:  · Talk about it- your feelings and reasons for harming yourself  · Remove any means that you might use to hurt yourself (examples: pills, rope, extension cords, firearm)  · Get professional help from the community (Mental Health, Substance Abuse, psychological counseling)  · Do not be alone:Call your Safe Contact- someone whom you trust who will be there for you.  · Call your local CRISIS HOTLINE 838-7014 or 164-376-1789  · Call your local Children's Mobile Crisis Response Team [place] (370) 930-2276 or www.ooma  · Call the toll free National Suicide  Prevention Hotlines   · National Suicide Prevention Lifeline 350-365-AJTU (3085)  · National Hope Line Network 800-SUICIDE (809-4838)    DISCHARGE SURVEY:  Thank you for choosing [facility].  We hope we provided you with very good care.  You may be receiving a survey in the mail.  Please fill it out.  Your opinion is valuable to us.    ADDITIONAL EDUCATIONAL MATERIALS GIVEN TO PATIENT:        My signature on this form indicates that:  1.  I have reviewed and understand the above information  2.  My questions regarding this information have been answered to my satisfaction.  3.  I have formulated a plan with my discharge nurse to obtain my prescribed medication for home.

## 2020-03-18 NOTE — CARE PLAN
Problem: Potential for postpartum infection related to presence of episiotomy/vaginal tear and/or uterine contamination  Goal: Patient will be absent from signs and symptoms of infection  Outcome: PROGRESSING AS EXPECTED  Note: No s/s of infection at this time.  Afebrile.  No complaints of chills.  Eating well.     Problem: Potential knowledge deficit related to lack of understanding of self and  care  Goal: Patient will verbalize understanding of self and infant care  Outcome: PROGRESSING AS EXPECTED  Note: Patient verbalizes and demonstrates understanding of self and  care.  4th child.     Problem: Communication  Goal: The ability to communicate needs accurately and effectively will improve  Outcome: MET  Note: Patient and FOB able to communicate verbally and in English.  Able to make their needs known to staff.

## 2020-03-18 NOTE — L&D DELIVERY NOTE
DATE OF SERVICE:  2020    IDENTIFICATION:  This is a 25-year-old  4, para 3-0-0-3, with an EDC of   2019, EGA of 39 and 2/7th weeks who presents with a chief complaint of   uterine contractions.    HISTORY OF PRESENT ILLNESS:  This is a patient of Dr. Milner.  She has gotten   good prenatal care.  Prenatal care has been uncomplicated.  She presents today   complaining of uterine contractions.  Her cervix was 4-5, 90 with a bulging   bag of water.  She was subsequently admitted, known beta strep positive.  She   was started on IV penicillin, received 2 doses and progressed over normal   labor curve to 8 cm at which point she was AROM clear and then she progressed   rapidly to complete, in hands and knees presentation, she was able to push   baby down to a +3 station, extended the baby's head and delivered   atraumatically.  Nares and mouth were bulb suctioned.  There was no nuchal   cord.  Shoulders and body followed rapidly without complication.  After delay,   the cord was clamped and cut.  Cord gases were not collected.  The infant was   handed off to awaiting nursing team.  At this point, the placenta delivered   intact, 3-vessel cord.  The uterus firmed up nicely after 20 units of Pitocin.    Cervix was intact.  There were no lacerations.  Estimated blood loss was 300   mL.  The patient and baby to recovery room in stable condition.    FINDINGS:  Include a male infant with Apgars of 8 and 8.  There were no   complications.       ____________________________________     MD IQRA Vyas / BRITTANY    DD:  2020 18:49:54  DT:  2020 21:26:57    D#:  [id number]  Job#:  [id number]

## 2020-03-18 NOTE — CONSULTS
Mom denies any questions or concerns. She feels baby is latching and suckling well and denies any discomfort. She nursed her now 5 year, 3, and 1 and 1/2 years olds for one year each.    Mom given Lanolin per her request. Advised to air dry her nipples after feedings, top with some expressed colostrum, and add lanolin if needed.    Mom encouraged to call for assistance prn.

## 2020-03-18 NOTE — PROGRESS NOTES
1900: Received report from MARQUES Figueredo and CABRERA Lambert RN. POC discussed.    2015: Pt taken to bathroom to void, skyler care done, educated about S/S of increased bleeding to report to RN/ MD, verbalizes understanding.    2035: Pt transferred via wheelchair to PPU. Baby in arms.    2045: Report given to JULIANNA Garces. POC discussed.

## 2020-03-18 NOTE — PROGRESS NOTES
Patient transferred from labor and delivery via wheelchair with infant in arms and FOB accompanying. Two RN verification of infant and parent armbands. Report received from JULIANNA Xvaier. Patient oriented to unit, call light, emergency light, and infant security. Assessment completed, fundus firm and palpable, lochia light rubra. Patient has already voided and is doing so without problems. Pain management discussed with pt. Patient declines intervention for pain at this time and asked that ibuprofen be given around the clock. Whiteboard updated. Second bag of pitocin infusing at 125 ml/hr. IV patent, no s/s of infiltration at insertion site. This is MOB fourth child and has  prior children. No assistance needed at this time. Pt. Oriented to call light, infant security, and I&O charting. Patient encouraged to call with needs.

## 2020-03-18 NOTE — PROGRESS NOTES
Discharge instruction for mom and baby discussed. Prescription given and explained. Emphasized the importance of  screening follow-up test. Questions and concerns have been answered. Baby is not 24 hours until 1839.

## 2020-03-18 NOTE — CARE PLAN
Problem: Altered physiologic condition related to immediate post-delivery state and potential for bleeding/hemorrhage  Goal: Patient physiologically stable as evidenced by normal lochia, palpable uterine involution and vital signs within normal limits  Outcome: PROGRESSING AS EXPECTED  Note: VSS and within normal parameters. Fundus palpable and firm, lochia light rubra. Will continue to monitor.       Problem: Alteration in comfort related to episiotomy, vaginal repair and/or after birth pains  Goal: Patient verbalizes acceptable pain level  Outcome: PROGRESSING AS EXPECTED  Note: Pain management discussed with pt. Will notify this RN if PRN pain medication is needed and would like ibuprofen when available at 0130. Will continue to monitor.

## 2020-03-18 NOTE — PROGRESS NOTES
Hospital Day : 1    S: doing well; donna diet; min lochia; breast feed    O:  Vitals:    20 19420 2100 20 0200   BP: 124/78 127/85 105/67 128/83   Pulse: (!) 55 68 72 72   Resp:   16 18   Temp:  36.8 °C (98.3 °F) 36.7 °C (98.1 °F) 37 °C (98.6 °F)   TempSrc:  Temporal Temporal Temporal   SpO2:   96% 94%   Weight:       Height:           Recent Labs     20  1355 20  0444   WBC 12.2* 13.4*   RBC 4.42 3.78*   HEMOGLOBIN 12.7 11.0*   HEMATOCRIT 39.5 33.3*   MCV 89.4 88.1   MCH 28.7 29.1   MCHC 32.2* 33.0*   RDW 44.0 43.0   PLATELETCT 227 190   MPV 10.6 10.4             abd soft ff    A: pp1 sp ; sp 2x abx for gbs; doing well    P: dc

## 2020-03-19 NOTE — PROGRESS NOTES
Received bedside report from night shift RN. Assumed care of patient. Pt assessed and stable. VSS. Patient reports 1/10 pain at this time.  Discussed plan of care for day with patient and received verbal understanding. Call light within reach, bed in low position.  Educated pt re fall precautions and received verbal understanding.  However, pt continues to refuse bed alarms.  Pt is alert, oriented, steady on feet and calls appropriately.

## 2020-03-19 NOTE — PROGRESS NOTES
- Report received from JULIANNA Campuzano. Pt. And infant currently in NBN for 24 hours testing.      - Assessment complete. VSS and within normal parameters. Discharge paperwork and education given. No questions regarding discharge or infant paperwork, circumcision care, or  screening.     Pt discharged at approximately 1940 by RN. Infant placed in car seat by parents, and checked by RN. MOB offered wheelchair upon discharge and requested to walk. FOB carrying infant and bleongings. Pt discharge instructions provided at approximately 1533 by discharge RN. Checked armbands. Clamp and cuddles already removed. No further questions at this time.

## 2021-09-18 ENCOUNTER — HOSPITAL ENCOUNTER (EMERGENCY)
Facility: MEDICAL CENTER | Age: 26
End: 2021-09-19
Attending: OBSTETRICS & GYNECOLOGY | Admitting: OBSTETRICS & GYNECOLOGY
Payer: COMMERCIAL

## 2021-09-18 PROCEDURE — 302449 STATCHG TRIAGE ONLY (STATISTIC)

## 2021-09-19 VITALS
BODY MASS INDEX: 26.52 KG/M2 | RESPIRATION RATE: 16 BRPM | TEMPERATURE: 97.9 F | OXYGEN SATURATION: 96 % | DIASTOLIC BLOOD PRESSURE: 79 MMHG | SYSTOLIC BLOOD PRESSURE: 127 MMHG | HEIGHT: 66 IN | HEART RATE: 94 BPM | WEIGHT: 165 LBS

## 2021-09-19 PROCEDURE — 59025 FETAL NON-STRESS TEST: CPT

## 2021-09-19 NOTE — PROGRESS NOTES
0005: Pt is a  at 39.1 gestation. Pt presented to L&D c/o UC's. Pt denies VB, LOF, and feels +FM.     0013: SVE is /     0028: Telephoned Dr. Li, updated on pt arrival/complaints/status. Reviewed strip, and SVE with provider. Discharge orders received.     0030: Pt discharge instructions provided, pt verbalized understanding.

## 2021-09-21 ENCOUNTER — HOSPITAL ENCOUNTER (INPATIENT)
Facility: MEDICAL CENTER | Age: 26
LOS: 1 days | End: 2021-09-22
Attending: OBSTETRICS & GYNECOLOGY | Admitting: OBSTETRICS & GYNECOLOGY
Payer: COMMERCIAL

## 2021-09-21 LAB
BASOPHILS # BLD AUTO: 0.4 % (ref 0–1.8)
BASOPHILS # BLD: 0.05 K/UL (ref 0–0.12)
EOSINOPHIL # BLD AUTO: 0.12 K/UL (ref 0–0.51)
EOSINOPHIL NFR BLD: 0.9 % (ref 0–6.9)
ERYTHROCYTE [DISTWIDTH] IN BLOOD BY AUTOMATED COUNT: 44.8 FL (ref 35.9–50)
ERYTHROCYTE [DISTWIDTH] IN BLOOD BY AUTOMATED COUNT: 44.9 FL (ref 35.9–50)
HCT VFR BLD AUTO: 36.6 % (ref 37–47)
HCT VFR BLD AUTO: 40.3 % (ref 37–47)
HGB BLD-MCNC: 12.1 G/DL (ref 12–16)
HGB BLD-MCNC: 13.1 G/DL (ref 12–16)
HOLDING TUBE BB 8507: NORMAL
IMM GRANULOCYTES # BLD AUTO: 0.18 K/UL (ref 0–0.11)
IMM GRANULOCYTES NFR BLD AUTO: 1.4 % (ref 0–0.9)
LYMPHOCYTES # BLD AUTO: 1.71 K/UL (ref 1–4.8)
LYMPHOCYTES NFR BLD: 12.9 % (ref 22–41)
MCH RBC QN AUTO: 29.2 PG (ref 27–33)
MCH RBC QN AUTO: 29.5 PG (ref 27–33)
MCHC RBC AUTO-ENTMCNC: 32.5 G/DL (ref 33.6–35)
MCHC RBC AUTO-ENTMCNC: 33.1 G/DL (ref 33.6–35)
MCV RBC AUTO: 89.3 FL (ref 81.4–97.8)
MCV RBC AUTO: 90 FL (ref 81.4–97.8)
MONOCYTES # BLD AUTO: 1.27 K/UL (ref 0–0.85)
MONOCYTES NFR BLD AUTO: 9.6 % (ref 0–13.4)
NEUTROPHILS # BLD AUTO: 9.95 K/UL (ref 2–7.15)
NEUTROPHILS NFR BLD: 74.8 % (ref 44–72)
NRBC # BLD AUTO: 0 K/UL
NRBC BLD-RTO: 0 /100 WBC
PLATELET # BLD AUTO: 186 K/UL (ref 164–446)
PLATELET # BLD AUTO: 202 K/UL (ref 164–446)
PMV BLD AUTO: 10.9 FL (ref 9–12.9)
PMV BLD AUTO: 10.9 FL (ref 9–12.9)
RBC # BLD AUTO: 4.1 M/UL (ref 4.2–5.4)
RBC # BLD AUTO: 4.48 M/UL (ref 4.2–5.4)
SARS-COV+SARS-COV-2 AG RESP QL IA.RAPID: NOTDETECTED
SPECIMEN SOURCE: NORMAL
WBC # BLD AUTO: 12.7 K/UL (ref 4.8–10.8)
WBC # BLD AUTO: 13.3 K/UL (ref 4.8–10.8)

## 2021-09-21 PROCEDURE — 59409 OBSTETRICAL CARE: CPT

## 2021-09-21 PROCEDURE — A9270 NON-COVERED ITEM OR SERVICE: HCPCS | Performed by: OBSTETRICS & GYNECOLOGY

## 2021-09-21 PROCEDURE — 87426 SARSCOV CORONAVIRUS AG IA: CPT

## 2021-09-21 PROCEDURE — 85025 COMPLETE CBC W/AUTO DIFF WBC: CPT

## 2021-09-21 PROCEDURE — 85027 COMPLETE CBC AUTOMATED: CPT

## 2021-09-21 PROCEDURE — 304965 HCHG RECOVERY SERVICES

## 2021-09-21 PROCEDURE — 36415 COLL VENOUS BLD VENIPUNCTURE: CPT

## 2021-09-21 PROCEDURE — 700102 HCHG RX REV CODE 250 W/ 637 OVERRIDE(OP): Performed by: OBSTETRICS & GYNECOLOGY

## 2021-09-21 PROCEDURE — 302449 STATCHG TRIAGE ONLY (STATISTIC)

## 2021-09-21 PROCEDURE — 700111 HCHG RX REV CODE 636 W/ 250 OVERRIDE (IP): Performed by: OBSTETRICS & GYNECOLOGY

## 2021-09-21 PROCEDURE — 770002 HCHG ROOM/CARE - OB PRIVATE (112)

## 2021-09-21 RX ORDER — BISACODYL 10 MG
10 SUPPOSITORY, RECTAL RECTAL PRN
Status: DISCONTINUED | OUTPATIENT
Start: 2021-09-21 | End: 2021-09-22 | Stop reason: HOSPADM

## 2021-09-21 RX ORDER — VITAMIN A ACETATE, BETA CAROTENE, ASCORBIC ACID, CHOLECALCIFEROL, .ALPHA.-TOCOPHEROL ACETATE, DL-, THIAMINE MONONITRATE, RIBOFLAVIN, NIACINAMIDE, PYRIDOXINE HYDROCHLORIDE, FOLIC ACID, CYANOCOBALAMIN, CALCIUM CARBONATE, FERROUS FUMARATE, ZINC OXIDE, CUPRIC OXIDE 3080; 12; 120; 400; 1; 1.84; 3; 20; 22; 920; 25; 200; 27; 10; 2 [IU]/1; UG/1; MG/1; [IU]/1; MG/1; MG/1; MG/1; MG/1; MG/1; [IU]/1; MG/1; MG/1; MG/1; MG/1; MG/1
1 TABLET, FILM COATED ORAL EVERY MORNING
Status: DISCONTINUED | OUTPATIENT
Start: 2021-09-21 | End: 2021-09-22 | Stop reason: HOSPADM

## 2021-09-21 RX ORDER — CITRIC ACID/SODIUM CITRATE 334-500MG
30 SOLUTION, ORAL ORAL EVERY 6 HOURS PRN
Status: DISCONTINUED | OUTPATIENT
Start: 2021-09-21 | End: 2021-09-21 | Stop reason: HOSPADM

## 2021-09-21 RX ORDER — DEXTROSE, SODIUM CHLORIDE, SODIUM LACTATE, POTASSIUM CHLORIDE, AND CALCIUM CHLORIDE 5; .6; .31; .03; .02 G/100ML; G/100ML; G/100ML; G/100ML; G/100ML
INJECTION, SOLUTION INTRAVENOUS CONTINUOUS
Status: DISCONTINUED | OUTPATIENT
Start: 2021-09-21 | End: 2021-09-22 | Stop reason: HOSPADM

## 2021-09-21 RX ORDER — SODIUM CHLORIDE, SODIUM LACTATE, POTASSIUM CHLORIDE, CALCIUM CHLORIDE 600; 310; 30; 20 MG/100ML; MG/100ML; MG/100ML; MG/100ML
INJECTION, SOLUTION INTRAVENOUS PRN
Status: DISCONTINUED | OUTPATIENT
Start: 2021-09-21 | End: 2021-09-22 | Stop reason: HOSPADM

## 2021-09-21 RX ORDER — IBUPROFEN 600 MG/1
600 TABLET ORAL EVERY 6 HOURS PRN
Status: DISCONTINUED | OUTPATIENT
Start: 2021-09-21 | End: 2021-09-22 | Stop reason: HOSPADM

## 2021-09-21 RX ORDER — SODIUM CHLORIDE, SODIUM LACTATE, POTASSIUM CHLORIDE, CALCIUM CHLORIDE 600; 310; 30; 20 MG/100ML; MG/100ML; MG/100ML; MG/100ML
INJECTION, SOLUTION INTRAVENOUS CONTINUOUS
Status: ACTIVE | OUTPATIENT
Start: 2021-09-21 | End: 2021-09-21

## 2021-09-21 RX ORDER — ONDANSETRON 2 MG/ML
4 INJECTION INTRAMUSCULAR; INTRAVENOUS EVERY 6 HOURS PRN
Status: DISCONTINUED | OUTPATIENT
Start: 2021-09-21 | End: 2021-09-22 | Stop reason: HOSPADM

## 2021-09-21 RX ORDER — DOCUSATE SODIUM 100 MG/1
100 CAPSULE, LIQUID FILLED ORAL 2 TIMES DAILY PRN
Status: DISCONTINUED | OUTPATIENT
Start: 2021-09-21 | End: 2021-09-22 | Stop reason: HOSPADM

## 2021-09-21 RX ORDER — ONDANSETRON 4 MG/1
4 TABLET, ORALLY DISINTEGRATING ORAL EVERY 6 HOURS PRN
Status: DISCONTINUED | OUTPATIENT
Start: 2021-09-21 | End: 2021-09-22 | Stop reason: HOSPADM

## 2021-09-21 RX ORDER — MISOPROSTOL 200 UG/1
800 TABLET ORAL
Status: DISCONTINUED | OUTPATIENT
Start: 2021-09-21 | End: 2021-09-21 | Stop reason: HOSPADM

## 2021-09-21 RX ORDER — ACETAMINOPHEN 325 MG/1
650 TABLET ORAL EVERY 6 HOURS PRN
Status: DISCONTINUED | OUTPATIENT
Start: 2021-09-21 | End: 2021-09-22 | Stop reason: HOSPADM

## 2021-09-21 RX ORDER — MISOPROSTOL 200 UG/1
800 TABLET ORAL
Status: DISCONTINUED | OUTPATIENT
Start: 2021-09-21 | End: 2021-09-22 | Stop reason: HOSPADM

## 2021-09-21 RX ORDER — HYDROCODONE BITARTRATE AND ACETAMINOPHEN 5; 325 MG/1; MG/1
1 TABLET ORAL EVERY 6 HOURS PRN
Status: DISCONTINUED | OUTPATIENT
Start: 2021-09-21 | End: 2021-09-22 | Stop reason: HOSPADM

## 2021-09-21 RX ADMIN — IBUPROFEN 600 MG: 600 TABLET ORAL at 18:28

## 2021-09-21 RX ADMIN — PRENATAL WITH FERROUS FUM AND FOLIC ACID 1 TABLET: 3080; 920; 120; 400; 22; 1.84; 3; 20; 10; 1; 12; 200; 27; 25; 2 TABLET ORAL at 07:50

## 2021-09-21 RX ADMIN — IBUPROFEN 600 MG: 600 TABLET ORAL at 04:21

## 2021-09-21 RX ADMIN — OXYTOCIN 2000 ML/HR: 10 INJECTION, SOLUTION INTRAMUSCULAR; INTRAVENOUS at 03:08

## 2021-09-21 RX ADMIN — IBUPROFEN 600 MG: 600 TABLET ORAL at 12:07

## 2021-09-21 RX ADMIN — OXYTOCIN 125 ML/HR: 10 INJECTION, SOLUTION INTRAMUSCULAR; INTRAVENOUS at 04:20

## 2021-09-21 ASSESSMENT — EDINBURGH POSTNATAL DEPRESSION SCALE (EPDS)
I HAVE FELT SAD OR MISERABLE: NOT VERY OFTEN
I HAVE BEEN ANXIOUS OR WORRIED FOR NO GOOD REASON: NO, NOT AT ALL
I HAVE BEEN ABLE TO LAUGH AND SEE THE FUNNY SIDE OF THINGS: AS MUCH AS I ALWAYS COULD
THINGS HAVE BEEN GETTING ON TOP OF ME: NO, I HAVE BEEN COPING AS WELL AS EVER
I HAVE BLAMED MYSELF UNNECESSARILY WHEN THINGS WENT WRONG: NO, NEVER
I HAVE BEEN SO UNHAPPY THAT I HAVE BEEN CRYING: ONLY OCCASIONALLY
THE THOUGHT OF HARMING MYSELF HAS OCCURRED TO ME: NEVER
I HAVE LOOKED FORWARD WITH ENJOYMENT TO THINGS: AS MUCH AS I EVER DID
I HAVE BEEN SO UNHAPPY THAT I HAVE HAD DIFFICULTY SLEEPING: NOT AT ALL
I HAVE FELT SCARED OR PANICKY FOR NO GOOD REASON: NO, NOT AT ALL

## 2021-09-21 ASSESSMENT — PATIENT HEALTH QUESTIONNAIRE - PHQ9
SUM OF ALL RESPONSES TO PHQ9 QUESTIONS 1 AND 2: 0
2. FEELING DOWN, DEPRESSED, IRRITABLE, OR HOPELESS: NOT AT ALL
1. LITTLE INTEREST OR PLEASURE IN DOING THINGS: NOT AT ALL

## 2021-09-21 ASSESSMENT — PAIN DESCRIPTION - PAIN TYPE
TYPE: ACUTE PAIN

## 2021-09-21 NOTE — LACTATION NOTE
Mother reports that she is breastfeeding her  without difficulty or discomfort. She has breast fed her other 4 children for about a year each.

## 2021-09-21 NOTE — PROGRESS NOTES
"Patient stating that she would like to discharge tomorrow morning, and states that she has already spoken to the doctor about it.  Patient states understanding of why baby typically stay under observation for 24 hours and states \"I will sign whatever they want me to sign\".  RN assured patient that this would be passed along in report for oncoming shift.  "

## 2021-09-21 NOTE — PROGRESS NOTES
0455-Patient transferred from labor and delivery. Two RN verification of infant and parent armbands. Report received from JULIANNA Macias. Patient oriented to unit, call light, emergency light, and infant security. Assessment completed, fundus firm @ u, lochia light. Patient has already voided and is doing so without problems. Patient declines intervention for pain at this time. Pitocin infusing at 125 ml/hr. Patient encouraged to call with needs.

## 2021-09-21 NOTE — L&D DELIVERY NOTE
SPONTANEOUS VAGINAL DELIVERY PROCEDURE NOTE    DATE OF DELIVERY: 2021     PRIMARY OBSTETRICIAN: Deacon Milner M.D.    DELIVERING OBSTETRICIAN: Deacon Milner MD    PROCEDURE: Precipitous spontaneous vaginal delivery    PREPROCEDURE DIAGNOSES:  1.  Intrauterine pregnancy at 39w3d   2.  Spontaneous labor  3.  GBS positive    POSTPROCEDURE DIAGNOSES:  1.  Intrauterine pregnancy at 39w3d   2.  Spontaneous labor  3.  GBS positive.  4.  Postpartum status post precipitous spontaneous vaginal delivery.    ANESTHESIA: None    FINDINGS:  1.  Viable female infant in cephalic presentation delivered at 02:56 on 2021.   2.  Birth Weight: 3.535 kg (7 lb 12.7 oz) .  3.  APGARs 8 at 1 minute, 9 at 5 minutes.  4.  Intact, normal-appearing placenta, three-vessel cord, central insertion.  5.  No obstetric laceration.  6.  Clear amniotic fluid.  7.  Patient arrived on the unit and delivered within 17 minutes of arrival    ESTIMATED BLOOD LOSS: 150mL    NARRATIVE:   This 26-year-old -0-0-4 with intrauterine pregnancy at 39 weeks 3 days gestational age presented to labor and delivery in active labor.  She was found to be anterior lip with a bulging bag of fluid on presentation.  She ruptured membranes within moments.  I was called for delivery and presented to the room just as the baby was being delivered.  The infant was delivered in whole atraumatically.    In whole the patient was on the floor for 17 minutes prior to delivery.  I was informed that the patient presents approximately 5 minutes prior to delivery.   The infant was warmed and dried and suctioned with the bulb suction by the awaiting baby nurse.  The infant was moving all extremities equally.  The cord was doubly clamped and cut and the placenta delivered quickly there after during active management of the third stage of labor with fundal massage, gentle cord traction and application of oxytocin at postpartum dosing.   A survey of the patient's  perineum, vulva and vagina revealed no obstetric lacerations.  The patient tolerated the procedure well.  There were no complications.  Sponge and needle counts are correct at the end of the procedure.  The patient and her infant remained in her birthing room before later transfer to maternity.      COMPLICATIONS: none    CONDITION: good    DISPOSITION: Labor room then Maternity         Deacon Milner MD, MS,  9/21/2021

## 2021-09-21 NOTE — CARE PLAN
The patient is Stable - Low risk of patient condition declining or worsening    Shift Goals  Clinical Goals: remain clinically stable     Progress made toward(s) clinical / shift goals:  patient clinically stable at this time. Assessment ongoing.     Patient is not progressing towards the following goals:

## 2021-09-21 NOTE — H&P
"Obstetrics and Gynecology  Labor and Delivery History and Physical    Date of Admission: 2021      ID: 26 y.o.  with IUP at 39w3d     Primary OB: Deacon Milner M.D.    Attending OB: Deacon Milner M.D.    CC: CTX    HPI: Yesenia Lindsay is a 26 y.o.  at 39w3d by 10 week ultrasound, who presents with contractions.  She states that she has been celia regularly and frequently (between every 8 and 15 minutes) since she was evaluated here on labor and delivery 3 days ago.  She only decided to come in tonight because they began to double her over.  She denied leaking of fluid until just before delivery.  She denied vaginal bleeding.  She endorsed normal fetal movement prior to delivery.  Current pregnancy has been uncomplicated to this point    ROS: 10 systems reviewed and negative except as noted above.    Obstetric History    - 2014, , 37wk, M, 6lb7oz, no complications.  G2 - 2017, , 41wk, F, 7lb7oz, no complications.  G3 - 9/10/2018, , 39w4d, F, 6lb 15.6oz, PROM, IOL for same  G4 - 2020, , 39w2d, M, 8lb 0oz, no complications  G5 - Current, as noted in HPI      Past Medical History  Surgical History   none none      Gynecologic History  Social History   Regular menses prior to pregnancy  Denies Hx of abnormal pap smears.  Denies Hx of STIs, specifically denies a history of HSV for patient or partner Tobacco: denies  EtOH: denies  Street Drugs: denies  SAHM      Medications  Allergies   PNV No Known Allergies     Family History   PGM (breast CA)       O: /70   Pulse 71   Temp 36.4 °C (97.5 °F) (Temporal)   Resp 18   Ht 1.67 m (5' 5.75\")   Wt 75 kg (165 lb 5.5 oz)   SpO2 96%   BMI 26.89 kg/m²       Gen: NAD, AAO  Resp: unlabored  Abd: fundus at U post delivery  Ext: NTTP, no edema, 2+DPP  Pelvic: no obstetric lacerations    FHT:  Baseline of 135 then broken tracing until delivery  [place]: not well traced    Labs:   Lab Results "   Component Value Date/Time    WBC 13.3 (H) 2021 03:08 AM    RBC 4.48 2021 03:08 AM    HEMOGLOBIN 13.1 2021 03:08 AM    HEMATOCRIT 40.3 2021 03:08 AM    MCV 90.0 2021 03:08 AM    RDW 44.8 2021 03:08 AM    PLATELETCT 202 2021 03:08 AM       Prenatal labs:   Lab  Result    Rh  positive    Antibody screen  negative    Rubella  immune    HIV  Non-reactive    RPR  Non-reactive    HBsAg  negative    Varicella  immune    Urine Culture  wnl    Gonorrhea/chlamydia  negative/negative    Aneuploidy screening  declined    1h Glucose  54    GBS  POSITIVE       A/P: Yesenia Lindsay is a  at 39w3d by 10wk U/S who presents in active labor and went on to deliver within minutes of arrival.  GBS+, but did not have an IV until after delivery.    *Admit to L&D  *IV, CBC, T&S on hold  *Routine PP care  *Postpartum oxytocin.  *Transfer to PP in 1h post delivery    Deacon Milner MD, MS,  2021, 4:43 AM

## 2021-09-21 NOTE — PROGRESS NOTES
Late entry due to patient care  0239 Patient is a  at 39weeks and 3days, EDC of . Arrived to unit and placed in S215, RN placed EFM and toco to ensure fetal well being and monitor uterine activity. RN educated patient on need for monitors and patient verbalized understanding. Patient unable to sit still due to contractions, requesting to get up to void. Vital signs taken. Patient's chief complaint contractions.  Patient denies leaking or vaginal bleeding, reports positive fetal movement.   0248 Dr. Milner notified patient is ant lip with a bulging bag of water, MD placing orders and will be at bedside.   0253 SROM clear fluid, complete.   0255 Dr. Milner notified patient MD abelino placing orders, will come to bedside.    0256 Delivery of viable female at 39 weeks + 3 days with clear fluid, infant to mother chest dried and stimulated with warm towel. Infant in stable condition, apgars 8/9 placed to mother's chest skin to skin. Delivery bands placed on infant's ankle and wrist by transition RN, band placed on patient's wrist, and second adult band placed on Elton's's wrist.   0302 Placenta delivered, spontaneously and intact.   0309 Count correct. Fundus firm.   0430 Patient ambulated to restroom to void, patient able to void, tolerated well; pericare provided.   0455 Patient transferred to Postpartum unit with baby in arms, both in stable condition; bedside report given to Renu; Postpartum Unit RN assumed care; plan of care discussed; vital signs stable; cuddles and bands verified with both RNs at bedside, remaining pitocin bag programmed on pump.

## 2021-09-22 VITALS
HEART RATE: 86 BPM | BODY MASS INDEX: 26.57 KG/M2 | DIASTOLIC BLOOD PRESSURE: 72 MMHG | OXYGEN SATURATION: 96 % | HEIGHT: 66 IN | WEIGHT: 165.34 LBS | RESPIRATION RATE: 18 BRPM | TEMPERATURE: 97.8 F | SYSTOLIC BLOOD PRESSURE: 118 MMHG

## 2021-09-22 PROCEDURE — 700102 HCHG RX REV CODE 250 W/ 637 OVERRIDE(OP): Performed by: OBSTETRICS & GYNECOLOGY

## 2021-09-22 PROCEDURE — A9270 NON-COVERED ITEM OR SERVICE: HCPCS | Performed by: OBSTETRICS & GYNECOLOGY

## 2021-09-22 RX ADMIN — IBUPROFEN 600 MG: 600 TABLET ORAL at 05:58

## 2021-09-22 RX ADMIN — IBUPROFEN 600 MG: 600 TABLET ORAL at 00:42

## 2021-09-22 ASSESSMENT — PAIN DESCRIPTION - PAIN TYPE: TYPE: ACUTE PAIN

## 2021-09-22 NOTE — DISCHARGE INSTRUCTIONS
PATIENT DISCHARGE EDUCATION INSTRUCTION SHEET  REASONS TO CALL YOUR OBSTETRICIAN  · Persistent fever, shaking, chills (Temperature higher than 100.4) may indicate you have an infection  · Heavy bleeding: soaking more than 1 pad per hour; Passing clots an egg-sized clot or bigger may mean you have an postpartum hemorrhage  · Foul odor from vagina or bad smelling or discolored discharge or blood  · Breast infection (Mastitis symptoms); breast pain, chills, fever, redness or red streaks, may feel flu like symptoms  · Urinary pain, burning or frequency  · Incision that is not healing, increased redness, swelling, tenderness or pain, or any pus from episiotomy or  site may mean you have an infection  · Redness, swelling, warmth, or painful to touch in the calf area of your leg may mean you have a blood clot  · Severe or intensified depression, thoughts or feelings of wanting to hurt yourself or someone else   · Pain in chest, obstructed breathing or shortness of breath (trouble catching your breath) may mean you are having a postpartum complication. Call your provider immediately   · Headache that does not get better, even after taking medicine, a bad headache with vision changes or pain in the upper right area of your belly may mean you have high blood pressure or post birth preeclampsia. Call your provider immediately    HAND WASHING  All family and friends should wash their hands:  · Before and after holding the baby  · Before feeding the baby  · After using the restroom or changing the baby's diaper    WOUND CARE  Ask your physician for additional care instructions. In general:  ·  Incision:  · May shower and pat incision dry   · Keep the incision clean and dry  · There should not be any opening or pus from the incision  · Continue to walk at home 3 times a day   · Do NOT lift anything heavier than your baby (over 10 pounds)  · Encourage family to help participate in care of the  to allow  rest and mom time to heal  · Episiotomy/Laceration  · May use skyler-spray bottle, witch hazel pads and dermaplast spray for comfort  · Use skyler-spray bottle after urinating to cleanse perineal area  · To prevent burning during urination spray skyler-water bottle on labial area   · Pat perineal area dry until episiotomy/laceration is healed  · Continue to use skyler-bottle until bleeding stops as needed  · If have a 2nd degree laceration or greater, a Sitz bath can offer relief from soreness, burning, and inflammation   · Sitz Bath   · Sit in 6 inches of warm water and soak laceration as needed until the laceration heals    VAGINAL CARE AND BLEEDING  · Nothing inside vagina for 6 weeks:   · No sexual intercourse, tampons or douching  · Bleeding may continue for 2-4 weeks. Amount and color may vary  · Soaking 1 pad or more in an hour for several hours is considered heavy bleeding  · Passing large egg sized blood clots can be concerning  · If you feel like you have heavy bleeding or are having increasing amount of blood clots call your Obstetrician immediately  · If you begin feeling faint upon standing, feeling sick to your stomach, have clammy skin, a really fast heartbeat, have chills, start feeling confused, dizzy, sleepy or weak, or feeling like you're going to faint call your Obstetrician immediately    HYPERTENSION   Preeclampsia or gestational hypertension are types of high blood pressure that only pregnant women can get. It is important for you to be aware of symptoms to seek early intervention and treatment. If you have any of these symptoms immediately call your Obstetrician    · Vision changes or blurred vision   · Severe headache or pain that is unrelieved with medication and will not go away  · Persistent pain in upper abdomen or shoulder   · Increased swelling of face, feet, or hands  · Difficulty breathing or shortness of breath at rest  · Urinating less than usual    URINATION AND BOWEL MOVEMENTS  · Eating  "more fiber (bran cereal, fruits, and vegetables) and drinking plenty of fluids will help to avoid constipation  · Urinary frequency and urgency after childbirth is normal  · If you experience any urinary pain, burning or frequency call your provider    BIRTH CONTROL  · It is possible to become pregnant at any time after delivery and while breastfeeding  · Plan to discuss a method of birth control with your physician at your post delivery follow up visit    POSTPARTUM BLUES  During the first few days after birth, you may experience a sense of the \"blues\" which may include impatience, irritability or even crying. These feelings come and go quickly. However, as many as 1 in 10 women experience emotional symptoms known as postpartum depression.     POSTPARTUM DEPRESSION    May start as early as the second or third day after delivery or take several weeks or months to develop. Symptoms of \"blues\" are present, but are more intense: Crying spells; loss of appetite; feelings of hopelessness or loss of control; fear of touching the baby; over concern or no concern at all about the baby; little or no concern about your own appearance/caring for yourself; and/or inability to sleep or excessive sleeping. Contact your Obstetrician if you are experiencing any of these symptoms     PREVENTING SHAKEN BABY  If you are angry or stressed, PUT THE BABY IN THE CRIB, step away, take some deep breaths, and wait until you are calm to care for the baby. DO NOT SHAKE THE BABY. You are not alone, call a supporter for help.  · [facility] 24/7 crisis call line (641-616-0637) or (1-687.366.4487)  · You can also text them, text \"ANSWER\" ([phone])      "

## 2021-09-22 NOTE — DISCHARGE SUMMARY
Discharge Summary:      Yesenia Lindsay    Admit Date:   2021  Discharge Date:  2021     Admitting diagnosis:  Indication for care in labor or delivery [O75.9]  Labor and delivery, indication for care [O75.9]  Discharge Diagnosis: Status post vaginal, spontaneous.  Pregnancy Complications: group B strep (untreated)  Tubal Ligation:  no        History:  No past medical history on file.  OB History    Para Term  AB Living   5 4 4     5   SAB TAB Ectopic Molar Multiple Live Births           0 5      # Outcome Date GA Lbr Aurelio/2nd Weight Sex Delivery Anes PTL Lv   5 Term 21 39w3d  3.535 kg (7 lb 12.7 oz) F Vag-Spont None N EVAN   4       Vag-Spont   EVAN   3 Term      Vag-Spont   EVAN   2 Term      Vag-Spont   EVAN   1 Term      Vag-Spont   EVAN        Patient has no known allergies.  There are no problems to display for this patient.       Hospital Course:   26 y.o. , now para 5, was admitted with the above mentioned diagnosis, underwent Active Labor, vaginal, spontaneous. Patient postpartum course was unremarkable, with progressive advancement in diet , ambulation and toleration of oral analgesia. Patient without complaints today and desires discharge.      Vitals:    21 1800 21 2200 21 0200 21 0545   BP: 106/57 104/63 112/69 118/72   Pulse: 66 69 79 86   Resp: 20 18 18 18   Temp: 36.6 °C (97.9 °F) 36.4 °C (97.6 °F) 36.7 °C (98 °F) 36.6 °C (97.8 °F)   TempSrc: Temporal Temporal Temporal Temporal   SpO2: 96% 96% 96% 96%   Weight:       Height:           Current Facility-Administered Medications   Medication Dose   • D5LR infusion     • ondansetron (ZOFRAN ODT) dispertab 4 mg  4 mg    Or   • ondansetron (ZOFRAN) syringe/vial injection 4 mg  4 mg   • oxytocin (PITOCIN) infusion (for postpartum)   mL/hr   • ibuprofen (MOTRIN) tablet 600 mg  600 mg   • acetaminophen (Tylenol) tablet 650 mg  650 mg   • HYDROcodone-acetaminophen (NORCO) 5-325 MG per  tablet 1 Tablet  1 Tablet   • LR infusion     • PRN oxytocin (PITOCIN) (20 Units/1000 mL) PRN for excessive uterine bleeding - See Admin Instr  125-999 mL/hr   • miSOPROStol (CYTOTEC) tablet 800 mcg  800 mcg   • docusate sodium (COLACE) capsule 100 mg  100 mg   • bisacodyl (DULCOLAX) suppository 10 mg  10 mg   • tetanus-dipth-acell pertussis (Tdap) inj 0.5 mL  0.5 mL   • prenatal plus vitamin (STUARTNATAL 1+1) 27-1 MG tablet 1 Tablet  1 Tablet       Exam:  Breast Exam: negative  Abdomen: Abdomen soft, non-tender. BS normal. No masses,  No organomegaly  Fundus Non Tender: yes  Incision: none  Perineum: perineum intact  Extremity: extremities, peripheral pulses and reflexes normal     Labs:  Recent Labs     09/21/21  0308 09/21/21  1321   WBC 13.3* 12.7*   RBC 4.48 4.10*   HEMOGLOBIN 13.1 12.1   HEMATOCRIT 40.3 36.6*   MCV 90.0 89.3   MCH 29.2 29.5   MCHC 32.5* 33.1*   RDW 44.8 44.9   PLATELETCT 202 186   MPV 10.9 10.9        Activity:   Discharge to home  Pelvic Rest x 6 weeks    Assessment:  normal postpartum course  Discharge Assessment: No areas of skin breakdown/redness; surgical incision intact/healing     Follow up:[facility] 6 weeks     Discharge Meds:   No current outpatient medications on file.       Lenka Glaser M.D.

## 2021-09-22 NOTE — PROGRESS NOTES
0745 Assessment completed. Plan of care reviewed, verbalized understanding. Denies pain at this time, will call if pain med intervention needed.   0995 Discharge instructions reviewed, verbalized understanding, papers signed. Prescribed med information given, reviewed, verbalized understanding.   1000 Left facility escorted by staff.

## 2021-09-22 NOTE — PROGRESS NOTES
1900- Received report from JULIANNA Gomez. Assumed care of pt. FOB at bedside.    2045- Assessment complete. Fundus firm and palpable, lochia light rubra. Pain management and interventions discussed with pt. Parents bonding with infant well. MOB breastfeeding comfortably and independently. well per pt. Discharge paperwork discussed with parents at bedside. All questions and concerns discussed at this time. No further needs. Will continue to monitor.

## 2021-09-22 NOTE — CARE PLAN
Problem: Knowledge Deficit - Postpartum  Goal: Patient will verbalize and demonstrate understanding of self and infant care  Outcome: Progressing     Problem: Psychosocial - Postpartum  Goal: Patient will verbalize and demonstrate effective bonding and parenting behavior  Outcome: Progressing     Problem: Altered Physiologic Condition  Goal: Patient physiologically stable as evidenced by normal lochia, palpable uterine involution and vitals within normal limits  Outcome: Progressing     Problem: Respiratory/Oxygenation Function Post-Surgical  Goal: Patient will achieve/maintain normal respiratory rate/effort  Outcome: Progressing       The patient is Stable - Low risk of patient condition declining or worsening    Shift Goals  Clinical Goals: Remain clinically stable     Progress made toward(s) clinical / shift goals:  Patient able to ambulate without difficulty and care for self and infant independently. VS stable and patient pain well managed with PRN ibuprofen.    Patient is not progressing towards the following goals:

## 2022-04-28 ENCOUNTER — APPOINTMENT (RX ONLY)
Dept: URBAN - METROPOLITAN AREA CLINIC 22 | Facility: CLINIC | Age: 27
Setting detail: DERMATOLOGY
End: 2022-04-28

## 2022-04-28 DIAGNOSIS — D22 MELANOCYTIC NEVI: ICD-10-CM

## 2022-04-28 DIAGNOSIS — L72.8 OTHER FOLLICULAR CYSTS OF THE SKIN AND SUBCUTANEOUS TISSUE: ICD-10-CM

## 2022-04-28 DIAGNOSIS — L70.8 OTHER ACNE: ICD-10-CM

## 2022-04-28 DIAGNOSIS — Z71.89 OTHER SPECIFIED COUNSELING: ICD-10-CM

## 2022-04-28 DIAGNOSIS — D18.0 HEMANGIOMA: ICD-10-CM

## 2022-04-28 DIAGNOSIS — L81.4 OTHER MELANIN HYPERPIGMENTATION: ICD-10-CM

## 2022-04-28 PROBLEM — D22.5 MELANOCYTIC NEVI OF TRUNK: Status: ACTIVE | Noted: 2022-04-28

## 2022-04-28 PROBLEM — D18.01 HEMANGIOMA OF SKIN AND SUBCUTANEOUS TISSUE: Status: ACTIVE | Noted: 2022-04-28

## 2022-04-28 PROCEDURE — 99203 OFFICE O/P NEW LOW 30 MIN: CPT

## 2022-04-28 PROCEDURE — ? EXTRACTIONS

## 2022-04-28 PROCEDURE — ? ADDITIONAL NOTES

## 2022-04-28 PROCEDURE — ? SUNSCREEN RECOMMENDATIONS

## 2022-04-28 PROCEDURE — ? COUNSELING

## 2022-04-28 ASSESSMENT — LOCATION DETAILED DESCRIPTION DERM
LOCATION DETAILED: LEFT INFERIOR UPPER BACK
LOCATION DETAILED: LEFT INFERIOR CENTRAL MALAR CHEEK
LOCATION DETAILED: LEFT PROXIMAL DORSAL FOREARM
LOCATION DETAILED: LEFT LATERAL ABDOMEN
LOCATION DETAILED: NASAL TIP

## 2022-04-28 ASSESSMENT — LOCATION SIMPLE DESCRIPTION DERM
LOCATION SIMPLE: ABDOMEN
LOCATION SIMPLE: LEFT FOREARM
LOCATION SIMPLE: LEFT UPPER BACK
LOCATION SIMPLE: LEFT CHEEK
LOCATION SIMPLE: NOSE

## 2022-04-28 ASSESSMENT — LOCATION ZONE DERM
LOCATION ZONE: NOSE
LOCATION ZONE: FACE
LOCATION ZONE: TRUNK
LOCATION ZONE: ARM

## 2022-04-28 NOTE — PROCEDURE: EXTRACTIONS
Acne Type: Comedonal Lesions
Consent was obtained and risks were reviewed including but not limited to scarring, infection, bleeding, scabbing, incomplete removal, and allergy to anesthesia.
Post-Care Instructions: I reviewed with the patient in detail post-care instructions. Patient is to keep the treatment areas dry overnight, and then apply bacitracin twice daily until healed. Patient may apply hydrogen peroxide soaks to remove any crusting.
Render Post-Care Instructions In Note?: no
Extraction Method: 11 blade and comedo extractor
Detail Level: Detailed
Prep Text (Optional): Prior to removal the treatment areas were prepped in the usual fashion.

## 2022-04-28 NOTE — PROCEDURE: ADDITIONAL NOTES
Additional Notes: Size is 0.4 x 0.5 well circumscribed bump overlying cartilage of nasal tip.   Non tender, no erythema.  Patient feels it has been there about 1 year, very slowly growing.   Discussed possible cyst however will monitor, if grows or becomes inflamed patient will let us know.
Detail Level: Simple
Render Risk Assessment In Note?: no

## 2024-04-22 NOTE — CARE PLAN
Problem: Knowledge Deficit - L&D  Goal: Patient and family/caregivers will demonstrate understanding of plan of care, disease process/condition, diagnostic tests and medications  Outcome: Progressing     Problem: Knowledge Deficit - Standard  Goal: Patient and family/care givers will demonstrate understanding of plan of care, disease process/condition, diagnostic tests and medications  Outcome: Progressing     Problem: Pain - Standard  Goal: Alleviation of pain or a reduction in pain to the patient’s comfort goal  Outcome: Progressing     Problem: Knowledge Deficit - Postpartum  Goal: Patient will verbalize and demonstrate understanding of self and infant care  Outcome: Progressing   The patient is Stable - Low risk of patient condition declining or worsening    Shift Goals  Clinical Goals: remain clinically stable     Progress made toward(s) clinical / shift goals:      Patient is not progressing towards the following goals:       4.22.24  LVM for pt to cb and schedule NP Appt with Davi for snoring.  Referred by Blake Wilson (3rd Attempt and letter sent out)  ADS